# Patient Record
Sex: MALE | Race: WHITE | NOT HISPANIC OR LATINO | Employment: UNEMPLOYED | URBAN - METROPOLITAN AREA
[De-identification: names, ages, dates, MRNs, and addresses within clinical notes are randomized per-mention and may not be internally consistent; named-entity substitution may affect disease eponyms.]

---

## 2023-03-23 ENCOUNTER — TRANSFERRED RECORDS (OUTPATIENT)
Dept: HEALTH INFORMATION MANAGEMENT | Facility: CLINIC | Age: 33
End: 2023-03-23

## 2023-03-29 ENCOUNTER — TRANSFERRED RECORDS (OUTPATIENT)
Dept: HEALTH INFORMATION MANAGEMENT | Facility: CLINIC | Age: 33
End: 2023-03-29

## 2023-04-12 ENCOUNTER — TRANSFERRED RECORDS (OUTPATIENT)
Dept: HEALTH INFORMATION MANAGEMENT | Facility: CLINIC | Age: 33
End: 2023-04-12

## 2023-08-16 ENCOUNTER — TRANSFERRED RECORDS (OUTPATIENT)
Dept: HEALTH INFORMATION MANAGEMENT | Facility: CLINIC | Age: 33
End: 2023-08-16

## 2023-10-18 ENCOUNTER — TRANSCRIBE ORDERS (OUTPATIENT)
Dept: OTHER | Age: 33
End: 2023-10-18

## 2023-10-18 DIAGNOSIS — K62.82 DYSPLASIA OF ANUS: Primary | ICD-10-CM

## 2023-12-14 ENCOUNTER — TRANSFERRED RECORDS (OUTPATIENT)
Dept: HEALTH INFORMATION MANAGEMENT | Facility: CLINIC | Age: 33
End: 2023-12-14

## 2023-12-15 ENCOUNTER — TELEPHONE (OUTPATIENT)
Dept: SURGERY | Facility: CLINIC | Age: 33
End: 2023-12-15

## 2023-12-15 NOTE — TELEPHONE ENCOUNTER
Records Requested    Facility  [facility]  Fax: [phone]   Outcome * 12/15/23 9:33 AM Faxed urgent request to [facility] for records to be faxed to the clinic. - Melvi    * 12/15/23 11:18 AM Records received from [facility] and sent to HIM to be scanned into the chart. - Melvi    [facility]:  12/14/23, 9/7/23 - GI OV with Dr. Weaver  9/11/23 - CR OV with Dr. Del Angel  8/16/23 - SURG OV with Dr. Forrest  7/17/23 - CR OV with Dr. Alvarez    8/16/23 - OP Note for EUA, FULGURATION AND BIOPSY OF ANAL CONDYLOMA with Dr. Bhatt    5/29/23 - Colonoscopy  5/29/23 - EGD

## 2024-01-05 ENCOUNTER — OFFICE VISIT (OUTPATIENT)
Dept: SURGERY | Facility: CLINIC | Age: 34
End: 2024-01-05
Payer: COMMERCIAL

## 2024-01-05 VITALS — HEART RATE: 115 BPM | DIASTOLIC BLOOD PRESSURE: 72 MMHG | SYSTOLIC BLOOD PRESSURE: 123 MMHG | OXYGEN SATURATION: 98 %

## 2024-01-05 DIAGNOSIS — K62.82 ANAL DYSPLASIA: Primary | ICD-10-CM

## 2024-01-05 LAB
LAB DIRECTOR COMMENTS: ABNORMAL
LAB DIRECTOR DISCLAIMER: ABNORMAL
LAB DIRECTOR INTERPRETATION: ABNORMAL
LAB DIRECTOR METHODOLOGY: ABNORMAL
LAB DIRECTOR RESULTS: ABNORMAL
SPECIMEN DESCRIPTION: ABNORMAL

## 2024-01-05 PROCEDURE — 87624 HPV HI-RISK TYP POOLED RSLT: CPT | Performed by: NURSE PRACTITIONER

## 2024-01-05 PROCEDURE — 99203 OFFICE O/P NEW LOW 30 MIN: CPT | Mod: 25 | Performed by: NURSE PRACTITIONER

## 2024-01-05 PROCEDURE — 88112 CYTOPATH CELL ENHANCE TECH: CPT | Mod: 26 | Performed by: PATHOLOGY

## 2024-01-05 PROCEDURE — G0452 MOLECULAR PATHOLOGY INTERPR: HCPCS | Mod: 26 | Performed by: STUDENT IN AN ORGANIZED HEALTH CARE EDUCATION/TRAINING PROGRAM

## 2024-01-05 PROCEDURE — 88305 TISSUE EXAM BY PATHOLOGIST: CPT | Mod: TC | Performed by: NURSE PRACTITIONER

## 2024-01-05 PROCEDURE — 88305 TISSUE EXAM BY PATHOLOGIST: CPT | Mod: 26 | Performed by: PATHOLOGY

## 2024-01-05 PROCEDURE — 88112 CYTOPATH CELL ENHANCE TECH: CPT | Mod: TC | Performed by: NURSE PRACTITIONER

## 2024-01-05 PROCEDURE — 46601 DIAGNOSTIC ANOSCOPY: CPT | Performed by: NURSE PRACTITIONER

## 2024-01-05 PROCEDURE — 46607 DIAGNOSTIC ANOSCOPY & BIOPSY: CPT | Performed by: NURSE PRACTITIONER

## 2024-01-05 RX ORDER — PAROXETINE HYDROCHLORIDE HEMIHYDRATE 37.5 MG/1
1 TABLET, FILM COATED, EXTENDED RELEASE ORAL EVERY MORNING
COMMUNITY
Start: 2023-06-07

## 2024-01-05 RX ORDER — VORTIOXETINE 20 MG/1
1 TABLET, FILM COATED ORAL EVERY MORNING
COMMUNITY
Start: 2023-06-07

## 2024-01-05 RX ORDER — LIDOCAINE HYDROCHLORIDE 20 MG/ML
JELLY TOPICAL ONCE
Status: CANCELLED | OUTPATIENT
Start: 2024-01-05 | End: 2024-01-05

## 2024-01-05 RX ORDER — IODINE AND POTASSIUM IODIDE 50; 100 MG/ML; MG/ML
LIQUID ORAL ONCE
Status: COMPLETED | OUTPATIENT
Start: 2024-01-05 | End: 2024-01-05

## 2024-01-05 RX ORDER — LIDOCAINE HYDROCHLORIDE 20 MG/ML
JELLY TOPICAL ONCE
Status: COMPLETED | OUTPATIENT
Start: 2024-01-05 | End: 2024-01-05

## 2024-01-05 RX ORDER — PAROXETINE 10 MG/1
15 TABLET, FILM COATED ORAL
COMMUNITY
Start: 2023-06-07

## 2024-01-05 RX ORDER — EMTRICITABINE AND TENOFOVIR DISOPROXIL FUMARATE 200; 300 MG/1; MG/1
1 TABLET, FILM COATED ORAL EVERY EVENING
COMMUNITY

## 2024-01-05 RX ADMIN — IODINE AND POTASSIUM IODIDE: 50; 100 LIQUID ORAL at 08:16

## 2024-01-05 RX ADMIN — LIDOCAINE HYDROCHLORIDE: 20 JELLY TOPICAL at 08:16

## 2024-01-05 NOTE — PROGRESS NOTES
Surg[facility] Informed Consent Process Documentation    Study Name: High Resolution Anoscopy (HRA) Registry    IRB#: [id number]    ICF Version Date:  10.19.2023    Date of Approach/Consent: 1/5/2024      The subject was screened and meets all of the inclusion criteria and none of the exclusion criteria is met.      The subject was told:  -that the study involves research   -the purpose of the research study  -the expected duration of the study and the approximate number of subject sought  -of procedures that are identified as experimental  -of reasonably foreseeable risks or discomforts to the subject  -of any benefits to the subject or others that may be expected from the research  -of alternative procedures and/or treatment  -how the confidentiality of records would be maintained  -whether or not compensation and medical treatments are available should injury occur as a result of the study  -who to contact if they have questions related to the research study or questions regarding research subjects' rights  -that participation is completely voluntary and that their decision to or not to participate will have no impact on their relationships with the N and the staff    No study procedures were completed prior to the consent being obtained.  The use of historical information (lab or assessments) used for the purpose of the study was approved by subject.  The subject was fully aware that we would be reviewing their medical record for the study.    The subject demonstrated an understanding of what the study involved.  Specifically, how this study differed from standard of care at our center and what was required of the subject as part of the study.    The subject reviewed the consent form and was given the opportunity to ask questions before signing.  Questions and concerns were answered by the study staff and/or study physician.      A copy of the signed informed consent document was provided to the subject.  [x] Yes  [] No    The subject was offered a copy of the signed informed consent but declined. [] Yes [x] No    The consent required the use of a :   [] Yes []  No [x] NA    The subject required a legally-authorized representative (LAR) to sign on their behalf:                                                    [] Yes  [] No        [x] NA      Questions to Evaluate Subject Comprehension of Study:    Question: Adequate Response? If No, explain what actions were taken   What is being studied? [x] Yes  [] No   If you participate, what will be different than if you decide not to participate?  [x] Yes  [] No   How long will the study last; will you be required to return for visits?  [x] Yes  [] No   What kinds of risks are there?  [x] Yes  [] No   Do you understand that you can withdraw consent at any time and for any reason while participating in the study?   [x] Yes  [] No       :       [email or web address]  [phone]                             :           [email or web address]  [phone]

## 2024-01-05 NOTE — NURSING NOTE
Chief Complaint   Patient presents with    Follow Up       Vitals:    01/05/24 0810   BP: 123/72   BP Location: Left arm   Patient Position: Sitting   Cuff Size: Adult Regular   Pulse: 115   SpO2: 98%       There is no height or weight on file to calculate BMI.    Mary Harrison, CMA

## 2024-01-05 NOTE — PROGRESS NOTES
Colon and Rectal Surgery Clinic High Resolution Anoscopy Note    RE: Abdi Baer  : 1990  YOAN: 2024      Abdi Baer is a 33 year old MSM male with a history of anal dysplasia who presents today for high resolution anoscopy.     HPI: History of anal condylomata who underwent EU[facility] with [facility] and [facility] showing anal condylomata/LSIL. No immunosuppression. He does not smoke. Does have anal receptive intercourse but has not had this for over a year due to pain with anal intercourse. Had a colonoscopy earlier last year that was reportedly normal.    ASSESSMENT: Written, informed consent was obtained prior to procedure.  Prior to the start of the procedure and with procedural staff participation, I verbally confirmed the patient s identity using two indicators, relevant allergies, that the procedure was appropriate and matched the consent or emergent situation, and that the correct equipment/implants were available. Immediately prior to starting the procedure I conducted the Time Out with the procedural staff and re-confirmed the patient s name, procedure, and site/side. (The Joint Commission universal protocol was followed.)  Yes    Sedation (Moderate or Deep): None    Anal cytology was obtained with Dacron swab. Digital anal rectal exam was performed with some lesions palpable in the left lateral position. Dilute acetic acid soak was completed for 2 minutes. Lubricant was used to insert the anoscope. Performed high resolution microscopy using the Proctostation. The anal transition zone was viewed in its entirety. Abnormal areas noted were thickened areas of acetowhitening with atypical vessels in the posterior, right anterior, and left anterior positions at the dentate line. These were Lugol's negative. Biopsy was obtained at each of these sites using a baby Tischler forceps. Fulguration was not performed.   The perianal  area was inspected after acetic acid soak. The findings noted were no acetowhitening, punctation, or verruciform lesions.     The patient tolerated the procedures well.    PLAN: Will follow up with patient with results of biopsies from today. If high grade dysplasia or condyloma, would require fulguration in the OR. No anal fissure on exam today but given history of pain with anal intercourse, recommended he try pelvic floor physical therapy and he is agreeable to this.      For details of past medical history, surgical history, family history, medications, allergies, and review of systems, please see details below.    Medical history:  No past medical history on file.    Surgical history:  No past surgical history on file.    Family history:  No family history on file.    Medications:  No current outpatient medications on file.     Allergies:  The patienthas no allergies on file.    Social history:  Social History     Tobacco Use    Smoking status: Not on file    Smokeless tobacco: Not on file   Substance Use Topics    Alcohol use: Not on file     Marital status: .    Review of Systems:  There are no exam notes on file for this visit.     This procedure was performed under a collaborative agreement with Dr. Tad Sandoval MD, Chief of the Division of Colon and Rectal Surgery    Camille Fuller, NP-C  Colon and Rectal Surgery  [facility]      This note was created using speech recognition software and may contain unintended word substitutions.

## 2024-01-05 NOTE — PATIENT INSTRUCTIONS
See Reba Bernal, PT for anal fissures and pain  [facility] Dr[facility] Jerome Reddy

## 2024-01-05 NOTE — LETTER
2024       RE: Abdi Baer  [address] [place] [address]     Dear Colleague,    Thank you for referring your patient, Abdi Baer, to the [facility] COLON AND RECTAL SURGERY CLINIC [place] [facility]. Please see a copy of my visit note below.    Colon and Rectal Surgery Clinic High Resolution Anoscopy Note    RE: Abdi Baer  : 1990  YOAN: 2024      Abdi Baer is a 33 year old MSM male with a history of anal dysplasia who presents today for high resolution anoscopy.     HPI: History of anal condylomata who underwent EUA with biopsy and fulg[facility] on [facility] showing anal condylomata/LSIL. No immunosuppression. He does not smoke. Does have anal receptive intercourse but has not had this for over a year due to pain with anal intercourse. Had a colonoscopy earlier last year that was reportedly normal.    ASSESSMENT: Written, informed consent was obtained prior to procedure.  Prior to the start of the procedure and with procedural staff participation, I verbally confirmed the patient s identity using two indicators, relevant allergies, that the procedure was appropriate and matched the consent or emergent situation, and that the correct equipment/implants were available. Immediately prior to starting the procedure I conducted the Time Out with the procedural staff and re-confirmed the patient s name, procedure, and site/side. (The Joint Commission universal protocol was followed.)  Yes    Sedation (Moderate or Deep): None    Anal cytology was obtained with Dacron swab. Digital anal rectal exam was performed with some lesions palpable in the left lateral position. Dilute acetic acid soak was completed for 2 minutes. Lubricant was used to insert the anoscope. Performed high resolution microscopy using the Proctostation. The anal transition zone was  viewed in its entirety. Abnormal areas noted were thickened areas of acetowhitening with atypical vessels in the posterior, right anterior, and left anterior positions at the dentate line. Biopsy was obtained at each of these sites using a baby Tischler forceps. Fulguration was not performed.   The perianal area was inspected after acetic acid soak. The findings noted were no acetowhitening, punctation, or verruciform lesions.     The patient tolerated the procedures well.    PLAN: Will follow up with patient with results of biopsies from today. If high grade dysplasia or condyloma, would require fulguration in the OR. No anal fissure on exam today but given history of pain with anal intercourse, recommended he try pelvic floor physical therapy and he is agreeable to this.      For details of past medical history, surgical history, family history, medications, allergies, and review of systems, please see details below.    Medical history:  No past medical history on file.    Surgical history:  No past surgical history on file.    Family history:  No family history on file.    Medications:  No current outpatient medications on file.     Allergies:  The patienthas no allergies on file.    Social history:  Social History     Tobacco Use    Smoking status: Not on file    Smokeless tobacco: Not on file   Substance Use Topics    Alcohol use: Not on file     Marital status: .    Review of Systems:  There are no exam notes on file for this visit.     This procedure was performed under a collaborative agreement with Dr. Tad Sandoval MD, Chief of the Division of Colon and Rectal Surgery    This note was created using speech recognition software and may contain unintended word substitutions.      Again, thank you for allowing me to participate in the care of your patient.      Sincerely,    SCOTT Harris CNP

## 2024-01-08 DIAGNOSIS — K62.82 ANAL DYSPLASIA: Primary | ICD-10-CM

## 2024-01-08 LAB
PATH REPORT.COMMENTS IMP SPEC: ABNORMAL
PATH REPORT.COMMENTS IMP SPEC: ABNORMAL
PATH REPORT.COMMENTS IMP SPEC: NORMAL
PATH REPORT.COMMENTS IMP SPEC: NORMAL
PATH REPORT.COMMENTS IMP SPEC: YES
PATH REPORT.FINAL DX SPEC: ABNORMAL
PATH REPORT.FINAL DX SPEC: NORMAL
PATH REPORT.GROSS SPEC: ABNORMAL
PATH REPORT.GROSS SPEC: NORMAL
PATH REPORT.MICROSCOPIC SPEC OTHER STN: ABNORMAL
PATH REPORT.MICROSCOPIC SPEC OTHER STN: NORMAL
PATH REPORT.RELEVANT HX SPEC: ABNORMAL
PATH REPORT.RELEVANT HX SPEC: NORMAL
PHOTO IMAGE: NORMAL

## 2024-01-12 ENCOUNTER — TELEPHONE (OUTPATIENT)
Dept: SURGERY | Facility: CLINIC | Age: 34
End: 2024-01-12

## 2024-01-12 ENCOUNTER — HOSPITAL ENCOUNTER (OUTPATIENT)
Facility: AMBULATORY SURGERY CENTER | Age: 34
End: 2024-01-12
Attending: SURGERY
Payer: COMMERCIAL

## 2024-01-12 PROBLEM — K62.82 ANAL DYSPLASIA: Status: ACTIVE | Noted: 2024-01-08

## 2024-01-12 NOTE — TELEPHONE ENCOUNTER
On 1/12/2024 at 1:18 PM:    Patient is scheduled for surgery with Dr. Jerome Reddy    Spoke with: patient Abdi    Date of Surgery: Thurs 2/22/2024    [facility]    Informed patient they will need an adult  YES    Pre op with Provider (patient's surgery must be scheduled with Dr. Jerome Reddy)    Feb 05, 2024  9:45 AM  Pre-op H&P  (Arrive by 9:30 AM)  PAC EVALUATION with Tanisha [place] PA-C  [facility] Preoperative Assessment Center [facility] [phone]    [address] [place] [address]      Surgery packet: sent via [employer]     Additional comments:   - patient turned down surgery date 2/8/2024 in favor of 2/22/2024 since his  has an important medical appt on 2/9/2024 that he will be attending  - Sent message to clinical team about [facility] - [facility]    Marissa Rosado  Karine-op Coordinator  [place]  Direct Phone: [phone]

## 2024-01-26 DIAGNOSIS — G47.19 EXCESSIVE DAYTIME SLEEPINESS: Primary | ICD-10-CM

## 2024-02-05 ENCOUNTER — PRE VISIT (OUTPATIENT)
Dept: SURGERY | Facility: CLINIC | Age: 34
End: 2024-02-05

## 2024-02-05 ENCOUNTER — TELEPHONE (OUTPATIENT)
Dept: SURGERY | Facility: CLINIC | Age: 34
End: 2024-02-05

## 2024-02-05 ENCOUNTER — PREP FOR PROCEDURE (OUTPATIENT)
Dept: SURGERY | Facility: CLINIC | Age: 34
End: 2024-02-05

## 2024-02-05 ENCOUNTER — ANESTHESIA EVENT (OUTPATIENT)
Dept: SURGERY | Facility: CLINIC | Age: 34
End: 2024-02-05
Payer: COMMERCIAL

## 2024-02-05 ENCOUNTER — VIRTUAL VISIT (OUTPATIENT)
Dept: SURGERY | Facility: CLINIC | Age: 34
End: 2024-02-05
Payer: COMMERCIAL

## 2024-02-05 DIAGNOSIS — Z01.818 PRE-OP EVALUATION: Primary | ICD-10-CM

## 2024-02-05 PROCEDURE — 99203 OFFICE O/P NEW LOW 30 MIN: CPT | Mod: 95 | Performed by: PHYSICIAN ASSISTANT

## 2024-02-05 RX ORDER — BUDESONIDE AND FORMOTEROL FUMARATE DIHYDRATE 160; 4.5 UG/1; UG/1
2 AEROSOL RESPIRATORY (INHALATION) 2 TIMES DAILY
COMMUNITY

## 2024-02-05 RX ORDER — ARIPIPRAZOLE 10 MG/1
10 TABLET ORAL EVERY MORNING
COMMUNITY

## 2024-02-05 RX ORDER — OMEPRAZOLE 20 MG/1
20 TABLET, DELAYED RELEASE ORAL EVERY MORNING
COMMUNITY

## 2024-02-05 RX ORDER — MONTELUKAST SODIUM 10 MG/1
10 TABLET ORAL AT BEDTIME
COMMUNITY

## 2024-02-05 RX ORDER — ALBUTEROL SULFATE 90 UG/1
2 AEROSOL, METERED RESPIRATORY (INHALATION) EVERY 6 HOURS PRN
COMMUNITY

## 2024-02-05 RX ORDER — CETIRIZINE HYDROCHLORIDE 10 MG/1
10 TABLET ORAL EVERY MORNING
COMMUNITY

## 2024-02-05 ASSESSMENT — ENCOUNTER SYMPTOMS: SEIZURES: 0

## 2024-02-05 ASSESSMENT — LIFESTYLE VARIABLES: TOBACCO_USE: 0

## 2024-02-05 ASSESSMENT — PAIN SCALES - GENERAL: PAINLEVEL: NO PAIN (0)

## 2024-02-05 NOTE — H&P
Pre-Operative H & P     CC:  Preoperative exam to assess for increased cardiopulmonary risk while undergoing surgery and anesthesia.    Date of Encounter: 2/5/2024  Primary Care Physician:  No primary care provider on file.     Reason for visit:   Encounter Diagnosis   Name Primary?    Pre-op evaluation Yes       HPI  Abdi Baer is a 33 year old male who presents for pre-operative H & P in preparation for  Procedure Information       Case: [id number] Date/Time: 02/22/24 0830    Procedure: EXAM UNDER ANESTHESIA, RECTUM, ANOSCOPY, HIGH RESOLUTION, WITH anal dysplasia FULGURATION, BIOPSIES (Rectum)    Anesthesia type: MAC    Diagnosis: Anal dysplasia [K62.82]    Pre-op diagnosis: Anal dysplasia [K62.82]    Location: [place] [address] / [facility]    Providers: Jerome Reddy MD            Patient is being evaluated without significant comorbid conditions.     Mr. Hunter Baer has a known history of anal dysplasia. He is [facility] He was recently seen by colorectal surgery and is now scheduled for the above procedure.     History is obtained from the patient and chart review    Hx of abnormal bleeding or anti-platelet use: denies      Past Medical History  No past medical history on file.    Past Surgical History  Past Surgical History:   Procedure Laterality Date    EXAM UNDER ANESTHESIA ANUS      RECONSTRUCTION OF NOSE         Prior to Admission Medications  Current Outpatient Medications   Medication Sig Dispense Refill    cetirizine (ZYRTEC) 10 MG tablet Take 10 mg by mouth every morning      emtricitabine-tenofovir (TRUVADA) 200-300 MG per tablet Take 1 tablet by mouth every evening      montelukast (SINGULAIR) 10 MG tablet Take 10 mg by mouth at bedtime      PARoxetine (PAXIL) 10 MG tablet Take 15 mg by mouth daily (with lunch)      PARoxetine (PAXIL-CR) 37.5 MG 24 hr tablet Take 1 tablet by mouth  every morning      TRINTELLIX 20 MG tablet Take 1 tablet by mouth every morning         Allergies  Allergies   Allergen Reactions    Doxycycline Nausea and Vomiting    Amoxicillin Rash     Has taken cefitraxone before with out reaction. Per patient. 3/11/16   Treated with Bicillin IM with no reaction 6-2018 at [facility]       Social History  Social History     Socioeconomic History    Marital status:      Spouse name: Not on file    Number of children: Not on file    Years of education: Not on file    Highest education level: Not on file   Occupational History    Not on file   Tobacco Use    Smoking status: Never    Smokeless tobacco: Former     Types: Chew    Tobacco comments:     Uses nicotine lazonges   Substance and Sexual Activity    Alcohol use: Not Currently     Comment: Quit 2 yrs ago 02/05    Drug use: Yes     Types: Marijuana     Comment: 2-3 times a week    Sexual activity: Not on file   Other Topics Concern    Not on file   Social History Narrative    Not on file     Social Determinants of Health     Financial Resource Strain: Not on file   Food Insecurity: Not on file   Transportation Needs: Not on file   Physical Activity: Not on file   Stress: Not on file   Social Connections: Not on file   Interpersonal Safety: Not on file   Housing Stability: Not on file       Family History  Family History   Problem Relation Age of Onset    Anesthesia Reaction No family hx of     Deep Vein Thrombosis (DVT) No family hx of        Review of Systems  The complete review of systems is negative other than noted in the HPI or here.   Anesthesia Evaluation   Pt has had prior anesthetic.     History of anesthetic complications       ROS/MED HX  ENT/Pulmonary:     (+)                      asthma  Treatment: Inhaler prn and Inhaled steroids,              (-) tobacco use   Neurologic:  - neg neurologic ROS  (-) no seizures and no CVA   Cardiovascular:     (+)  - -   -  - -                                 No previous cardiac  testing  (-) taking anticoagulants/antiplatelets   METS/Exercise Tolerance: 4 - Raking leaves, gardening Comment: Riding bike, walking on treadmill, weight lifting    Hematologic:  - neg hematologic  ROS  (-) history of blood clots and history of blood transfusion   Musculoskeletal:  - neg musculoskeletal ROS     GI/Hepatic: Comment: Anal dysplasia     (+) GERD, Asymptomatic on medication,                  Renal/Genitourinary:  - neg Renal ROS     Endo:  - neg endo ROS  (-) chronic steroid usage   Psychiatric/Substance Use:     (+) psychiatric history        Infectious Disease:  - neg infectious disease ROS     Malignancy:  - neg malignancy ROS     Other:            Virtual visit -  No vitals were obtained    Physical Exam  Constitutional: Awake, alert, cooperative, no apparent distress, and appears stated age.  HENT: Normocephalic  Respiratory: non labored breathing   Neurologic: Awake, alert, oriented to name, place and time.   Neuropsychiatric: Calm, cooperative. Normal affect.      Prior Labs/Diagnostic Studies   All labs and imaging personally reviewed     EKG/ stress test - if available please see in ROS above   No results found.       No data to display                  The patient's records and results personally reviewed by this provider.     Outside records reviewed from: Care Everywhere      Assessment    Abdi Baer is a 33 year old male seen as a PAC referral for risk assessment and optimization for anesthesia.    Plan/Recommendations  Pt will be optimized for the proposed procedure.  See below for details on the assessment, risk, and preoperative recommendations    NEUROLOGY  - No history of TIA, CVA or seizure  -Post Op delirium risk factors:  No risk identified    ENT  - No current airway concerns.  Will need to be reassessed day of surgery.  Mallampati: Unable to assess  TM: Unable to assess    CARDIAC  - No history of CAD, Hypertension, and Afib  -Denies cardiac symptoms   - METS  "(Metabolic Equivalents)  Patient performs 4 or more METS exercise without symptoms            Total Score: 0      RCRI-Very low risk: Class 1 0.4% complication rate            Total Score: 0        PULMONARY  MAOSN Low Risk            Total Score: 1    MASON: Male      -asthma using budesonide daily and albuterol PRN. Feels his asthma is at baseline.   - Tobacco History    History   Smoking Status    Never   Smokeless Tobacco    Former    Types: Chew       GI  - anal dysplasia. Above procedure planned   PONV Low Risk  Total Score: 1           1 AN PONV: Patient is not a current smoker        /RENAL  - Baseline Creatinine  WNL    ENDOCRINE    - BMI: There is no height or weight on file to calculate BMI.  - No history of Diabetes Mellitus    HEME  VTE Low Risk 0.5%            Total Score: 2    VTE: Male      - No history of abnormal bleeding or antiplatelet use.    PSYCH  -PTSD. Using abilify, paxil and trintellix. Reports due to his PTSD, he is combative when waking from anesthesia. States he \"wakes swinging and no one should be within a couple feet.\" He reports due to this, he does better with general anesthesia than MAC anesthesia.   -due to this history, better candidate for the hospital setting than the [facility]. Will alert the surgery team. Patient is aware ultimate anesthesia plan will be determined by team DOS.     Different anesthesia methods/types have been discussed with the patient, but they are aware that the final plan will be decided by the assigned anesthesia provider on the date of service.    The patient is optimized for their procedure. AVS with information on surgery time/arrival time, meds and NPO status given by nursing staff. No further diagnostic testing indicated.    Please refer to the physical examination documented by the anesthesiologist in the anesthesia record on the day of surgery.    Video-Visit Details    Type of service:  Video Visit    Provider received verbal consent for a Video Visit " from the patient? Yes     Originating Location (pt. Location): Home    Distant Location (provider location):  Off-site  Mode of Communication:  Video Conference via [employer]  On the day of service:     Prep time: 6 minutes  Visit time: 12 minutes  Documentation time: 11 minutes  ------------------------------------------  Total time: 29 minutes      Tanisha [place] PA-C  Preoperative Assessment Center  [facility]  Clinic and Surgery Center  Phone: [phone]  Fax: [phone]

## 2024-02-05 NOTE — PROGRESS NOTES
Abdi is a 33 year old who is being evaluated via a billable video visit.      How would you like to obtain your AVS? MyChart  If the video visit is dropped, the invitation should be resent by: Text to cell phone: [phone]          HPI           Physical Exam

## 2024-02-05 NOTE — TELEPHONE ENCOUNTER
On 2/5/2024 at 11:39 AM:  Patient is on the [facility] Wait List to be rescheduled for surgery with Dr. Jerome Reddy     Spoke with: Abdi    Wait List Date of Surgery: Thurs 2/29/2024    Location: [facility]    Informed patient they will need an adult  YES    Pre op with Provider (patient's surgery must be scheduled with Dr. Jerome Reddy)    H&P: completed with PAC on 2/5/2024    Updated Surgery packet: sending via [employer] per patient request     Additional comments:   - per PAC provider Tanisha Overton, patient's surgery needs to be rescheduled to a Hospital OR d/t patient wakes from anesthesia combatively d/t PTSD and also General Anesthesia is recommended.    - Dr. Jerome Reddy's RNCC MAO Sargent replied confirming that case should be rescheduled to [facility], and anesthesia is to be switched to General.    - Case Modification entered to switch case to General Anesthesia.    - Dr. Reddy will not be doing cases at [facility] on currently scheduled DOS 2/22/2024, but will be doing cases at [facility] the following Thurs 2/29/2024.     - Removed case from [facility] OR schedule on 2/22/2024, and added case to the [facility] wait list for 2/29/2024.    On 2/5/2024 at 10:58 AM:  LVM for patient to call back regarding rescheduling - possibly to DOS 2/29/2024.    On 2/5/2024 at 11:02 AM:  Missed incoming call from patient.    On 2/5/2024 at 11:03 AM:  LVM for patient to call back regarding rescheduling.    Marissa Rosado  Karine-op Coordinator  [place]  Direct Phone: [phone]   Marissa Rosado  Karine-op Coordinator  [facility]  Direct Phone: [phone]

## 2024-02-05 NOTE — PATIENT INSTRUCTIONS
Preparing for Your Surgery      Name:  Abdi Baer   MRN:  [id number]   :  1990   Today's Date:  2024       Arriving for surgery:  Surgery date:  24  Arrival time:  07:00 am      Please come to:   [facility] [address] [address]     Parking is available in front of the Clinics and Surgery Center building from 5:30AM to 8:00PM.  -  Proceed to the 5th floor to check into the Ambulatory Surgery Center.              >> There will be patient concierges on the 1st and 5th floor, for assistance or an escort, if you would like.              >> Please call [phone] with any questions.    What can I eat or drink?  -  You may eat and drink normally up to 8 hours prior to arrival time.   -  You may have clear liquids until 2 hours prior to arrival time.  -2 FLEETS ENEMAS AM OF SURGERY    Examples of clear liquids:  Water  Clear broth  Juices (apple, white grape, white cranberry  and cider) without pulp  Noncarbonated, powder based beverages  (lemonade and Danish-Aid)  Sodas (Sprite, 7-Up, ginger ale and seltzer)  Coffee or tea (without milk or cream)  Gatorade    -  No Alcohol or cannabis products for at least 24 hours before surgery.     Which medicines can I take?    Hold Aspirin for 7 days before surgery.   Hold Multivitamins for 7 days before surgery.  Hold Supplements for 7 days before surgery.  Hold Ibuprofen (Advil, Motrin) for 1 day(s) before surgery--unless otherwise directed by surgeon.  Hold Naproxen (Aleve) for 4 days before surgery.    -  DO NOT take these medications the day of surgery:  Zyrtec.    -  PLEASE TAKE these medications the day of surgery:  Tylenol if needed; take other scheduled morning medications.    How do I prepare myself?  - Please take 2 showers (one the night prior to surgery and one the morning of surgery) using Scrubcare or Hibiclens soap.    Use this soap only from the neck to your  toes.     Leave the soap on your skin for one minute--then rinse thoroughly.      You may use your own shampoo and conditioner. No other hair products.   - Please remove all jewelry and body piercings.  - No lotions, deodorants or fragrance.  - No makeup or fingernail polish.   - Bring your ID and insurance card.    -If you use a CPAP machine, please bring the CPAP machine, tubing, and mask to hospital.    -If you have a Deep Brain Stimulator, Spinal Cord Stimulator, or any Neuro Stimulator device---you must bring the remote control to the hospital.      ALL PATIENTS GOING HOME THE SAME DAY OF SURGERY ARE REQUIRED TO HAVE A RESPONSIBLE ADULT TO DRIVE AND BE IN ATTENDANCE WITH THEM FOR 24 HOURS FOLLOWING SURGERY.    Covid testing policy as of 12/06/2022  Your surgeon will notify and schedule you for a COVID test if one is needed before surgery--please direct any questions or COVID symptoms to your surgeon      Questions or Concerns:    - For any questions regarding the day of surgery or your hospital stay, please contact the Pre Admission Nursing Office at [phone].       - If you have health changes between today and your surgery, please call your surgeon.       - For questions after surgery, please call your surgeons office.           Current Visitor Guidelines    You may have 2 visitors in the pre op area.    Visiting hours: 8 a.m. to 8:30 p.m.    Patients confirmed or suspected to have symptoms of COVID 19 or flu:     No visitors allowed for adult patients.   Children (under age 18) can have 1 named visitor.     People who are sick or showing symptoms of COVID 19 or flu:    Are not allowed to visit patients--we can only make exceptions in special situations.       Please follow these guidelines for your visit:          Please maintain social distance          Masking is optional--however at times you may be asked to wear a mask for the safety of yourself and others     Clean your hands with alcohol hand  . Do this when you arrive at and leave the building and patient room,    And again after you touch your mask or anything in the room.     Go directly to and from the room you are visiting.     Stay in the patient s room during your visit. Limit going to other places in the hospital as much as possible     Leave bags and jackets at home or in the car.     For everyone s health, please don t come and go during your visit. That includes for smoking   during your visit.

## 2024-02-29 ENCOUNTER — ANESTHESIA (OUTPATIENT)
Dept: SURGERY | Facility: CLINIC | Age: 34
End: 2024-02-29
Payer: COMMERCIAL

## 2024-02-29 ENCOUNTER — HOSPITAL ENCOUNTER (OUTPATIENT)
Facility: CLINIC | Age: 34
Discharge: HOME | End: 2024-02-29
Attending: SURGERY | Admitting: SURGERY
Payer: COMMERCIAL

## 2024-02-29 VITALS
OXYGEN SATURATION: 96 % | DIASTOLIC BLOOD PRESSURE: 65 MMHG | TEMPERATURE: 98 F | BODY MASS INDEX: 29.75 KG/M2 | HEART RATE: 77 BPM | HEIGHT: 69 IN | SYSTOLIC BLOOD PRESSURE: 103 MMHG | WEIGHT: 200.84 LBS | RESPIRATION RATE: 18 BRPM

## 2024-02-29 DIAGNOSIS — K62.82 ANAL DYSPLASIA: Primary | ICD-10-CM

## 2024-02-29 PROCEDURE — 360N000074 HC SURGERY LEVEL 1, PER MIN: Performed by: SURGERY

## 2024-02-29 PROCEDURE — 272N000001 HC OR GENERAL SUPPLY STERILE: Performed by: SURGERY

## 2024-02-29 PROCEDURE — 710N000012 HC RECOVERY PHASE 2, PER MINUTE: Performed by: SURGERY

## 2024-02-29 PROCEDURE — 250N000011 HC RX IP 250 OP 636: Performed by: NURSE ANESTHETIST, CERTIFIED REGISTERED

## 2024-02-29 PROCEDURE — 250N000009 HC RX 250: Performed by: SURGERY

## 2024-02-29 PROCEDURE — 370N000017 HC ANESTHESIA TECHNICAL FEE, PER MIN: Performed by: SURGERY

## 2024-02-29 PROCEDURE — 88305 TISSUE EXAM BY PATHOLOGIST: CPT | Mod: TC | Performed by: SURGERY

## 2024-02-29 PROCEDURE — 250N000025 HC SEVOFLURANE, PER MIN: Performed by: SURGERY

## 2024-02-29 PROCEDURE — 46910 DESTRUCTION ANAL LESION(S): CPT | Performed by: ANESTHESIOLOGY

## 2024-02-29 PROCEDURE — 46910 DESTRUCTION ANAL LESION(S): CPT | Performed by: NURSE ANESTHETIST, CERTIFIED REGISTERED

## 2024-02-29 PROCEDURE — 258N000003 HC RX IP 258 OP 636: Performed by: NURSE ANESTHETIST, CERTIFIED REGISTERED

## 2024-02-29 PROCEDURE — 272N000002 HC OR SUPPLY OTHER OPNP: Performed by: SURGERY

## 2024-02-29 PROCEDURE — 250N000011 HC RX IP 250 OP 636: Performed by: ANESTHESIOLOGY

## 2024-02-29 PROCEDURE — 46910 DESTRUCTION ANAL LESION(S): CPT | Performed by: SURGERY

## 2024-02-29 PROCEDURE — 710N000010 HC RECOVERY PHASE 1, LEVEL 2, PER MIN: Performed by: SURGERY

## 2024-02-29 PROCEDURE — 88305 TISSUE EXAM BY PATHOLOGIST: CPT | Mod: 26 | Performed by: PATHOLOGY

## 2024-02-29 PROCEDURE — 999N000141 HC STATISTIC PRE-PROCEDURE NURSING ASSESSMENT: Performed by: SURGERY

## 2024-02-29 PROCEDURE — 250N000009 HC RX 250: Performed by: NURSE ANESTHETIST, CERTIFIED REGISTERED

## 2024-02-29 RX ORDER — HALOPERIDOL 5 MG/ML
1 INJECTION INTRAMUSCULAR
Status: DISCONTINUED | OUTPATIENT
Start: 2024-02-29 | End: 2024-02-29 | Stop reason: HOSPADM

## 2024-02-29 RX ORDER — IODINE AND POTASSIUM IODIDE 50; 100 MG/ML; MG/ML
LIQUID ORAL PRN
Status: DISCONTINUED | OUTPATIENT
Start: 2024-02-29 | End: 2024-02-29 | Stop reason: HOSPADM

## 2024-02-29 RX ORDER — ONDANSETRON 4 MG/1
4 TABLET, ORALLY DISINTEGRATING ORAL EVERY 30 MIN PRN
Status: DISCONTINUED | OUTPATIENT
Start: 2024-02-29 | End: 2024-02-29 | Stop reason: HOSPADM

## 2024-02-29 RX ORDER — NALOXONE HYDROCHLORIDE 0.4 MG/ML
0.1 INJECTION, SOLUTION INTRAMUSCULAR; INTRAVENOUS; SUBCUTANEOUS
Status: DISCONTINUED | OUTPATIENT
Start: 2024-02-29 | End: 2024-02-29 | Stop reason: HOSPADM

## 2024-02-29 RX ORDER — LIDOCAINE 40 MG/G
CREAM TOPICAL
Status: DISCONTINUED | OUTPATIENT
Start: 2024-02-29 | End: 2024-02-29 | Stop reason: HOSPADM

## 2024-02-29 RX ORDER — HYDRALAZINE HYDROCHLORIDE 20 MG/ML
2.5-5 INJECTION INTRAMUSCULAR; INTRAVENOUS EVERY 10 MIN PRN
Status: DISCONTINUED | OUTPATIENT
Start: 2024-02-29 | End: 2024-02-29 | Stop reason: HOSPADM

## 2024-02-29 RX ORDER — OXYCODONE HYDROCHLORIDE 10 MG/1
10 TABLET ORAL
Status: DISCONTINUED | OUTPATIENT
Start: 2024-02-29 | End: 2024-02-29 | Stop reason: HOSPADM

## 2024-02-29 RX ORDER — HYDROMORPHONE HCL IN WATER/PF 6 MG/30 ML
0.4 PATIENT CONTROLLED ANALGESIA SYRINGE INTRAVENOUS EVERY 5 MIN PRN
Status: DISCONTINUED | OUTPATIENT
Start: 2024-02-29 | End: 2024-02-29 | Stop reason: HOSPADM

## 2024-02-29 RX ORDER — PROPOFOL 10 MG/ML
INJECTION, EMULSION INTRAVENOUS PRN
Status: DISCONTINUED | OUTPATIENT
Start: 2024-02-29 | End: 2024-02-29

## 2024-02-29 RX ORDER — ACETIC ACID 5 %
LIQUID (ML) MISCELLANEOUS PRN
Status: DISCONTINUED | OUTPATIENT
Start: 2024-02-29 | End: 2024-02-29 | Stop reason: HOSPADM

## 2024-02-29 RX ORDER — ONDANSETRON 2 MG/ML
4 INJECTION INTRAMUSCULAR; INTRAVENOUS EVERY 30 MIN PRN
Status: DISCONTINUED | OUTPATIENT
Start: 2024-02-29 | End: 2024-02-29 | Stop reason: HOSPADM

## 2024-02-29 RX ORDER — HYDROMORPHONE HCL IN WATER/PF 6 MG/30 ML
0.2 PATIENT CONTROLLED ANALGESIA SYRINGE INTRAVENOUS EVERY 5 MIN PRN
Status: DISCONTINUED | OUTPATIENT
Start: 2024-02-29 | End: 2024-02-29 | Stop reason: HOSPADM

## 2024-02-29 RX ORDER — LABETALOL HYDROCHLORIDE 5 MG/ML
10 INJECTION, SOLUTION INTRAVENOUS
Status: DISCONTINUED | OUTPATIENT
Start: 2024-02-29 | End: 2024-02-29 | Stop reason: HOSPADM

## 2024-02-29 RX ORDER — ACETAMINOPHEN 325 MG/1
650 TABLET ORAL EVERY 6 HOURS PRN
Qty: 30 TABLET | Refills: 0 | Status: SHIPPED | OUTPATIENT
Start: 2024-02-29 | End: 2024-03-14

## 2024-02-29 RX ORDER — ONDANSETRON 2 MG/ML
INJECTION INTRAMUSCULAR; INTRAVENOUS PRN
Status: DISCONTINUED | OUTPATIENT
Start: 2024-02-29 | End: 2024-02-29

## 2024-02-29 RX ORDER — LIDOCAINE HYDROCHLORIDE 20 MG/ML
INJECTION, SOLUTION INFILTRATION; PERINEURAL PRN
Status: DISCONTINUED | OUTPATIENT
Start: 2024-02-29 | End: 2024-02-29

## 2024-02-29 RX ORDER — OXYCODONE HYDROCHLORIDE 5 MG/1
5 TABLET ORAL
Status: DISCONTINUED | OUTPATIENT
Start: 2024-02-29 | End: 2024-02-29 | Stop reason: HOSPADM

## 2024-02-29 RX ORDER — FENTANYL CITRATE 50 UG/ML
INJECTION, SOLUTION INTRAMUSCULAR; INTRAVENOUS PRN
Status: DISCONTINUED | OUTPATIENT
Start: 2024-02-29 | End: 2024-02-29

## 2024-02-29 RX ORDER — ONDANSETRON 2 MG/ML
4 INJECTION INTRAMUSCULAR; INTRAVENOUS ONCE
Status: DISCONTINUED | OUTPATIENT
Start: 2024-02-29 | End: 2024-02-29 | Stop reason: HOSPADM

## 2024-02-29 RX ORDER — SODIUM CHLORIDE, SODIUM LACTATE, POTASSIUM CHLORIDE, CALCIUM CHLORIDE 600; 310; 30; 20 MG/100ML; MG/100ML; MG/100ML; MG/100ML
INJECTION, SOLUTION INTRAVENOUS CONTINUOUS
Status: DISCONTINUED | OUTPATIENT
Start: 2024-02-29 | End: 2024-02-29 | Stop reason: HOSPADM

## 2024-02-29 RX ORDER — BUPIVACAINE HYDROCHLORIDE AND EPINEPHRINE 2.5; 5 MG/ML; UG/ML
INJECTION, SOLUTION EPIDURAL; INFILTRATION; INTRACAUDAL; PERINEURAL PRN
Status: DISCONTINUED | OUTPATIENT
Start: 2024-02-29 | End: 2024-02-29 | Stop reason: HOSPADM

## 2024-02-29 RX ORDER — SODIUM CHLORIDE, SODIUM LACTATE, POTASSIUM CHLORIDE, CALCIUM CHLORIDE 600; 310; 30; 20 MG/100ML; MG/100ML; MG/100ML; MG/100ML
INJECTION, SOLUTION INTRAVENOUS CONTINUOUS PRN
Status: DISCONTINUED | OUTPATIENT
Start: 2024-02-29 | End: 2024-02-29

## 2024-02-29 RX ORDER — ACETAMINOPHEN 325 MG/1
650 TABLET ORAL
Status: DISCONTINUED | OUTPATIENT
Start: 2024-02-29 | End: 2024-02-29 | Stop reason: HOSPADM

## 2024-02-29 RX ORDER — ONDANSETRON 4 MG/1
4 TABLET, ORALLY DISINTEGRATING ORAL
Status: DISCONTINUED | OUTPATIENT
Start: 2024-02-29 | End: 2024-02-29 | Stop reason: HOSPADM

## 2024-02-29 RX ADMIN — PROPOFOL 70 MG: 10 INJECTION, EMULSION INTRAVENOUS at 10:48

## 2024-02-29 RX ADMIN — FENTANYL CITRATE 100 MCG: 50 INJECTION INTRAMUSCULAR; INTRAVENOUS at 10:07

## 2024-02-29 RX ADMIN — SUGAMMADEX 200 MG: 100 INJECTION, SOLUTION INTRAVENOUS at 11:22

## 2024-02-29 RX ADMIN — Medication 50 MG: at 10:07

## 2024-02-29 RX ADMIN — ONDANSETRON 4 MG: 2 INJECTION INTRAMUSCULAR; INTRAVENOUS at 11:02

## 2024-02-29 RX ADMIN — PROPOFOL 130 MG: 10 INJECTION, EMULSION INTRAVENOUS at 10:07

## 2024-02-29 RX ADMIN — SODIUM CHLORIDE, POTASSIUM CHLORIDE, SODIUM LACTATE AND CALCIUM CHLORIDE: 600; 310; 30; 20 INJECTION, SOLUTION INTRAVENOUS at 09:53

## 2024-02-29 RX ADMIN — SODIUM CHLORIDE, POTASSIUM CHLORIDE, SODIUM LACTATE AND CALCIUM CHLORIDE: 600; 310; 30; 20 INJECTION, SOLUTION INTRAVENOUS at 11:02

## 2024-02-29 RX ADMIN — HYDROMORPHONE HYDROCHLORIDE 0.4 MG: 0.2 INJECTION, SOLUTION INTRAMUSCULAR; INTRAVENOUS; SUBCUTANEOUS at 12:00

## 2024-02-29 RX ADMIN — MIDAZOLAM 4 MG: 1 INJECTION INTRAMUSCULAR; INTRAVENOUS at 09:53

## 2024-02-29 RX ADMIN — LIDOCAINE HYDROCHLORIDE 100 MG: 20 INJECTION, SOLUTION INFILTRATION; PERINEURAL at 10:07

## 2024-02-29 ASSESSMENT — ENCOUNTER SYMPTOMS
SEIZURES: 0
DYSRHYTHMIAS: 0

## 2024-02-29 ASSESSMENT — COPD QUESTIONNAIRES: COPD: 0

## 2024-02-29 ASSESSMENT — ACTIVITIES OF DAILY LIVING (ADL)
ADLS_ACUITY_SCORE: 35

## 2024-02-29 ASSESSMENT — LIFESTYLE VARIABLES: TOBACCO_USE: 1

## 2024-02-29 NOTE — ANESTHESIA POSTPROCEDURE EVALUATION
Patient: Abdi Baer    Procedure: Procedure(s):  EXAM UNDER ANESTHESIA, RECTUM, ANOSCOPY, HIGH RESOLUTION, WITH anal dysplasia FULGURATION, BIOPSIES       Anesthesia Type:  General    Note:  Disposition: Outpatient   Postop Pain Control: Uneventful            Sign Out: Well controlled pain   PONV: No   Neuro/Psych: Uneventful            Sign Out: Acceptable/Baseline neuro status   Airway/Respiratory: Uneventful            Sign Out: Acceptable/Baseline resp. status   CV/Hemodynamics: Uneventful            Sign Out: Acceptable CV status; No obvious hypovolemia; No obvious fluid overload   Other NRE: NONE   DID A NON-ROUTINE EVENT OCCUR? No           Last vitals:  Vitals Value Taken Time   /78 02/29/24 1215   Temp 36.6  C (97.8  F) 02/29/24 1215   Pulse 75 02/29/24 1221   Resp 8 02/29/24 1221   SpO2 98 % 02/29/24 1237   Vitals shown include unfiled device data.    Electronically Signed By: Jeffrey Abarca MD  February 29, 2024  12:39 PM

## 2024-02-29 NOTE — OP NOTE
"[facility] Colorectal Surgery Operative Report  February 29, 2024      PREOPERATIVE DIAGNOSIS:  1. History of high grade anal epithelial neoplasia  2. GERD  3. Asthma  4. PTSD    POSTOPERATIVE DIAGNOSIS:   1. History of high grade anal epithelial neoplasia  2. GERD  3. Asthma  4. PTSD    PROCEDURE:  1. Evaluation under anesthesia.  2. High resolution anoscopy  3. Anal biopsy  4. Fulguration of high grade anal dysplasia    ANESTHESIA: general anesthesia plus local anesthesia.    SURGEON:  Jerome Reddy MD, PhD     ASSISTANT(S): Amber Nixon MD - PGY-1.    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    General risks related to anorectal surgery were reviewed with the patient. These include, but are not limited to urinary retention, abscess, infection, bleeding, chronic pain, anal stenosis, fistula, fissure, and fecal incontinence.     OPERATIVE PROCEDURE: After obtaining informed consent, the patient was brought to the operating room and placed in the lithotomy position. Appropriate preoperative mechanical deep venous thrombosis prophylaxis was administered. general anesthesia was gently induced and he was endotracheally intubated. Bilateral lower extremity pneumatic compression devices were applied and all pressure points were cushioned.     After a \"time-out\" was performed, a total of 30 ml of Bupivacaine 0.25% without epinephrine was injected as a pudendal block.     Anal cytology was not obtained with Dacron swab. Digital anal rectal exam was performed with no major findings. Dilute acetic acid " soak was completed for 2 minutes. Lubricant was used to insert the anoscope. Performed high resolution microscopy using the THD video anoscope. The dentate line was viewed in its entirety. Abnormal areas noted were:    Left posterior SCJ - biopsy & fulguration  Left anterior SCJ - biopsy & fulguration     Biopsy was obtained. Fulguration was performed.     The perianal area was inspected after acetic acid soak. The findings noted were as above.     The patient tolerated the procedures well.    PLAN: Will follow up with patient with results of biopsy. If low grade dysplasia or normal, follow up in 12 months for repeat high resolution anoscopy. If high grade dysplasia follow up in 6 months.     COMPLICATIONS: none, immediately.    ESTIMATED BLOOD LOSS: 2 mL.    SPECIMEN(S): see above.    OPERATIVE FINDINGS: see above    DISPOSITION: PACU.    Jerome Reddy MD, PhD  Division of Colon and Rectal Surgery  [facility]      CC:  [facility] Surgery billing.  Camille Fuller NP.

## 2024-02-29 NOTE — ANESTHESIA CARE TRANSFER NOTE
Patient: Abdi Baer    Procedure: Procedure(s):  EXAM UNDER ANESTHESIA, RECTUM, ANOSCOPY, HIGH RESOLUTION, WITH anal dysplasia FULGURATION, BIOPSIES       Diagnosis: Anal dysplasia [K62.82]  Diagnosis Additional Information: No value filed.    Anesthesia Type:   General     Note:      Level of Consciousness: awake  Oxygen Supplementation: room air    Independent Airway: airway patency satisfactory and stable    Vital Signs Stable: post-procedure vital signs reviewed and stable    Patient transferred to: PACU  Comments: Tolerated anesthesia well  Handoff Report: Identifed the Patient, Identified the Reponsible Provider, Reviewed the pertinent medical history, Discussed the surgical course, Reviewed Intra-OP anesthesia mangement and issues during anesthesia, Set expectations for post-procedure period and Allowed opportunity for questions and acknowledgement of understanding      Vitals:  Vitals Value Taken Time   /76 02/29/24 1145   Temp     Pulse 77 02/29/24 1158   Resp 4 02/29/24 1158   SpO2 97 % 02/29/24 1158   Vitals shown include unfiled device data.    Electronically Signed By: SCOTT Gardner CRNA  February 29, 2024  11:59 AM

## 2024-02-29 NOTE — ANESTHESIA PROCEDURE NOTES
Airway       Patient location during procedure: OR       Procedure Start/Stop Times: 2/29/2024 10:10 AM  Staff -        CRNA: Drea Oviedo APRN CRNA       Performed By: CRNA  Consent for Airway        Urgency: elective  Indications and Patient Condition       Indications for airway management: yesica-procedural       Induction type:intravenous       Mask difficulty assessment: 1 - vent by mask    Final Airway Details       Final airway type: endotracheal airway       Successful airway: ETT - single  Endotracheal Airway Details        ETT size (mm): 7.5       Cuffed: yes       Cuff volume (mL): 8       Successful intubation technique: direct laryngoscopy       DL Blade Type: MAC 4       Grade View of Cords: 1       Position: Right       Measured from: lips       Secured at (cm): 22       Bite block used: None    Post intubation assessment        Placement verified by: capnometry and equal breath sounds        Number of attempts at approach: 2       Number of other approaches attempted: 0       Secured with: tape       Ease of procedure: easy       Dentition: Intact and Unchanged    Medication(s) Administered   Medication Administration Time: 2/29/2024 10:10 AM

## 2024-02-29 NOTE — ANESTHESIA PREPROCEDURE EVALUATION
Anesthesia Pre-Procedure Evaluation    Patient: Abdi Baer   MRN: [id number] : 1990        Procedure : Procedure(s):  EXAM UNDER ANESTHESIA, RECTUM, ANOSCOPY, HIGH RESOLUTION, WITH anal dysplasia FULGURATION, BIOPSIES          No past medical history on file.   Past Surgical History:   Procedure Laterality Date    EXAM UNDER ANESTHESIA ANUS      RECONSTRUCTION OF NOSE        Allergies   Allergen Reactions    Doxycycline Nausea and Vomiting    Amoxicillin Rash     Has taken cefitraxone before with out reaction. Per patient. 3/11/16   Treated with Bicillin IM with no reaction  at [facility]      Social History     Tobacco Use    Smoking status: Never    Smokeless tobacco: Former     Types: Chew    Tobacco comments:     Uses nicotine lazonges   Substance Use Topics    Alcohol use: Not Currently     Comment: Quit 2 yrs ago       Wt Readings from Last 1 Encounters:   24 91.1 kg (200 lb 13.4 oz)        Anesthesia Evaluation            ROS/MED HX  ENT/Pulmonary:     (+)                tobacco use (Chew; now use tobacco free nicotine lozenges), Past use,     asthma  Treatment: Inhaled steroids,              (-) COPD   Neurologic:    (-) no seizures, no CVA and no TIA   Cardiovascular:    (-) hypertension, CAD, arrhythmias and stent   METS/Exercise Tolerance: >4 METS    Hematologic:       Musculoskeletal:       GI/Hepatic:    (-) liver disease   Renal/Genitourinary:    (-) renal disease   Endo:    (-) Type II DM and thyroid disease   Psychiatric/Substance Use:       Infectious Disease:       Malignancy:       Other:            Physical Exam    Airway        Mallampati: II   TM distance: > 3 FB   Neck ROM: full   Mouth opening: > 3 cm    Respiratory Devices and Support         Dental       (+) Completely normal teeth      Cardiovascular          Rhythm and rate: regular and normal     Pulmonary           breath sounds clear to auscultation   (-) no rhonchi and no rales        OUTSIDE  "LABS:  CBC: No results found for: \"WBC\", \"HGB\", \"HCT\", \"PLT\"  BMP: No results found for: \"NA\", \"POTASSIUM\", \"CHLORIDE\", \"CO2\", \"BUN\", \"CR\", \"GLC\"  COAGS: No results found for: \"PTT\", \"INR\", \"FIBR\"  POC: No results found for: \"BGM\", \"HCG\", \"HCGS\"  HEPATIC: No results found for: \"ALBUMIN\", \"PROTTOTAL\", \"ALT\", \"AST\", \"GGT\", \"ALKPHOS\", \"BILITOTAL\", \"BILIDIRECT\", \"ROHAN\"  OTHER: No results found for: \"PH\", \"LACT\", \"A1C\", \"PUNEET\", \"PHOS\", \"MAG\", \"LIPASE\", \"AMYLASE\", \"TSH\", \"T4\", \"T3\", \"CRP\", \"SED\"    Anesthesia Plan    ASA Status:  2    NPO Status:  NPO Appropriate    Anesthesia Type: General.     - Airway: ETT   Induction: Intravenous, Propofol.   Maintenance: Inhalation.        Consents    Anesthesia Plan(s) and associated risks, benefits, and realistic alternatives discussed. Questions answered and patient/representative(s) expressed understanding.     - Discussed:     - Discussed with:  Patient      - Extended Intubation/Ventilatory Support Discussed: No.      - Patient is DNR/DNI Status: No     Use of blood products discussed: Yes.     - Discussed with: Patient.     - Consented: consented to blood products     Postoperative Care    Pain management: IV analgesics.   PONV prophylaxis: Dexamethasone or Solumedrol, Ondansetron (or other 5HT-3)     Comments:               Neymar Arevalo MD    I have reviewed the pertinent notes and labs in the chart from the past 30 days and (re)examined the patient.  Any updates or changes from those notes are reflected in this note.              # Overweight: Estimated body mass index is 29.66 kg/m  as calculated from the following:    Height as of this encounter: 1.753 m (5' 9\").    Weight as of this encounter: 91.1 kg (200 lb 13.4 oz).      "

## 2024-02-29 NOTE — DISCHARGE INSTRUCTIONS
Contacting your Doctor -   To contact a doctor, call Dr. Barry powell @[phone] or:  [phone] and ask for the resident on call for [place]Rect[facility] (answered 24 hours a day)   Emergency Department:  [facility]: [phone] if you are in need of immediate or emergent help

## 2024-03-01 LAB
PATH REPORT.COMMENTS IMP SPEC: NORMAL
PATH REPORT.COMMENTS IMP SPEC: NORMAL
PATH REPORT.FINAL DX SPEC: NORMAL
PATH REPORT.GROSS SPEC: NORMAL
PATH REPORT.MICROSCOPIC SPEC OTHER STN: NORMAL
PATH REPORT.RELEVANT HX SPEC: NORMAL
PHOTO IMAGE: NORMAL

## 2024-03-10 ENCOUNTER — HEALTH MAINTENANCE LETTER (OUTPATIENT)
Age: 34
End: 2024-03-10

## 2024-04-15 ASSESSMENT — SLEEP AND FATIGUE QUESTIONNAIRES
HOW LIKELY ARE YOU TO NOD OFF OR FALL ASLEEP WHILE SITTING INACTIVE IN A PUBLIC PLACE: SLIGHT CHANCE OF DOZING
HOW LIKELY ARE YOU TO NOD OFF OR FALL ASLEEP WHILE SITTING AND TALKING TO SOMEONE: SLIGHT CHANCE OF DOZING
HOW LIKELY ARE YOU TO NOD OFF OR FALL ASLEEP WHILE LYING DOWN TO REST IN THE AFTERNOON WHEN CIRCUMSTANCES PERMIT: MODERATE CHANCE OF DOZING
HOW LIKELY ARE YOU TO NOD OFF OR FALL ASLEEP WHILE WATCHING TV: SLIGHT CHANCE OF DOZING
HOW LIKELY ARE YOU TO NOD OFF OR FALL ASLEEP WHEN YOU ARE A PASSENGER IN A CAR FOR AN HOUR WITHOUT A BREAK: MODERATE CHANCE OF DOZING
HOW LIKELY ARE YOU TO NOD OFF OR FALL ASLEEP WHILE SITTING QUIETLY AFTER LUNCH WITHOUT ALCOHOL: SLIGHT CHANCE OF DOZING
HOW LIKELY ARE YOU TO NOD OFF OR FALL ASLEEP IN A CAR, WHILE STOPPED FOR A FEW MINUTES IN TRAFFIC: SLIGHT CHANCE OF DOZING
HOW LIKELY ARE YOU TO NOD OFF OR FALL ASLEEP WHILE SITTING AND READING: HIGH CHANCE OF DOZING

## 2024-04-22 ENCOUNTER — VIRTUAL VISIT (OUTPATIENT)
Dept: SLEEP MEDICINE | Facility: CLINIC | Age: 34
End: 2024-04-22
Payer: COMMERCIAL

## 2024-04-22 VITALS — HEIGHT: 69 IN | WEIGHT: 195 LBS | BODY MASS INDEX: 28.88 KG/M2

## 2024-04-22 DIAGNOSIS — G47.63 SLEEP RELATED BRUXISM: ICD-10-CM

## 2024-04-22 DIAGNOSIS — R51.9 SLEEP RELATED HEADACHES: ICD-10-CM

## 2024-04-22 DIAGNOSIS — F41.9 ANXIETY: ICD-10-CM

## 2024-04-22 DIAGNOSIS — R53.82 CHRONIC FATIGUE: ICD-10-CM

## 2024-04-22 DIAGNOSIS — R06.83 SNORING: ICD-10-CM

## 2024-04-22 DIAGNOSIS — G47.00 PERSISTENT INSOMNIA: Primary | ICD-10-CM

## 2024-04-22 PROBLEM — F90.9 ATTENTION DEFICIT HYPERACTIVITY DISORDER: Status: ACTIVE | Noted: 2024-04-22

## 2024-04-22 PROBLEM — G37.9 DEMYELINATING DISEASE OF CENTRAL NERVOUS SYSTEM, UNSPECIFIED (H): Status: ACTIVE | Noted: 2024-04-22

## 2024-04-22 PROBLEM — K21.9 GASTROESOPHAGEAL REFLUX DISEASE: Status: ACTIVE | Noted: 2024-04-22

## 2024-04-22 PROBLEM — R41.3 AMNESIA: Status: ACTIVE | Noted: 2024-04-22

## 2024-04-22 PROBLEM — Z77.29 EXPOSURE TO POTENTIALLY HAZARDOUS SUBSTANCE: Status: ACTIVE | Noted: 2024-03-06

## 2024-04-22 PROBLEM — M95.0 ACQUIRED DEFORMITY OF NOSE: Status: ACTIVE | Noted: 2024-04-22

## 2024-04-22 PROBLEM — M54.50 LOW BACK PAIN: Status: ACTIVE | Noted: 2024-04-22

## 2024-04-22 PROBLEM — F43.23 ADJUSTMENT DISORDER WITH MIXED ANXIETY AND DEPRESSED MOOD: Status: ACTIVE | Noted: 2024-04-22

## 2024-04-22 PROBLEM — Z87.820 PERSONAL HISTORY OF TRAUMATIC BRAIN INJURY: Status: ACTIVE | Noted: 2024-04-22

## 2024-04-22 PROBLEM — J30.1 ALLERGIC RHINITIS DUE TO POLLEN: Status: ACTIVE | Noted: 2024-04-22

## 2024-04-22 PROBLEM — J45.909 ASTHMA: Status: ACTIVE | Noted: 2024-04-22

## 2024-04-22 PROBLEM — G43.109 MIGRAINE WITH AURA, NOT INTRACTABLE, WITHOUT STATUS MIGRAINOSUS: Status: ACTIVE | Noted: 2024-04-22

## 2024-04-22 PROBLEM — F43.12 CHRONIC POST-TRAUMATIC STRESS DISORDER: Status: ACTIVE | Noted: 2024-04-22

## 2024-04-22 PROBLEM — M54.2 CERVICALGIA: Status: ACTIVE | Noted: 2018-06-07

## 2024-04-22 PROCEDURE — 99204 OFFICE O/P NEW MOD 45 MIN: CPT | Mod: 95 | Performed by: INTERNAL MEDICINE

## 2024-04-22 ASSESSMENT — PAIN SCALES - GENERAL: PAINLEVEL: NO PAIN (0)

## 2024-04-22 NOTE — NURSING NOTE
Is the patient currently in the state of [place]? YES    Visit mode:VIDEO    If the visit is dropped, the patient can be reconnected by: VIDEO VISIT: Send to e-mail at: [email or web address]    Will anyone else be joining the visit? NO  (If patient encounters technical issues they should call [phone]    How would you like to obtain your AVS? MyChart    Are changes needed to the allergy or medication list? Pt stated no med changes    Are refills needed on medications prescribed by this physician? NO    Reason for visit: Consult    Lisa MORANF  Has patient had flu shot for current/most recent flu season? If so, [facility]? [facility]

## 2024-04-22 NOTE — PROGRESS NOTES
Video-Visit Details    Type of service:  Video Visit    Video Visit Start Time:10:02 AM    Video End Time:10:35 AM    Originating Location (pt. Location): Home    Distant Location (provider location):  Off-site     Platform used for Video Visit: AmWell    Additional 15 minutes on the date of service was spent performing the following:    -Preparing to see the patient  -Obtaining and/or reviewing separately obtained history   -Ordering medications, tests, or procedures   -Documenting clinical information in the electronic or other health record     Thank you for the opportunity to participate in the care of  Abdi Baer.    Assessment and Plan:    In summary Abdi Baer is a 33 year old year old male here for sleep disturbance.  1. Persistent Insomnia/Chronic fatigue/Snoring/Anxiety/Sleep related bruxism/Sleep related headaches   Abdi Baer has high risk for obstructive sleep apnea based on the history of persistent insomnia, chronic fatigue, snoring and a crowded airway. I educated the patient on the underlying pathophysiology of obstructive sleep apnea. We reviewed the risks associated with sleep apnea, including increased cardiovascular risk and overall death. We talked about treatments briefly. I recommend getting a full-night nocturnal polysomnography. The patient should return to the clinic to discuss results and treatment option in a patient-centered approach.      History of cranial facial surgery? Yes. History of nasal surgery. Will need ENT evaluation prior to PAP therapy.    Lab reviewed: Discussed with patient.    History of present illness:    He is a 33 year old male who comes to the virtual clinic with a chief complaint of persistent insomnia that has been going on for approximately 4 years. While the patient denies any episodes of witness apnea, he has been told that he does have occasional snoring during sleep. His main complaints is that he has difficulty  initiating and maintaining sleep. He also complain of sleep related bruxism and headaches. His poor sleep quality would cause chronic fatigue during his waking hours. Complicating matters further is the fact that he is a  [facility]     Ideal Sleep-Wake Cycle(devoid of societal pressure):    Patient would try to initiate sleep at around 10:30 PM. Final wake up time is around 11 AM.    TIME IN BED:    1) Work/School Days:    Do you work or go to school? Yes   What time do you usually get into bed? 10:30   About how long does it take you to fall asleep? about an hour   How often do you have trouble falling asleep? 4-5   How often do you wake up during the night? 2-3   Do you work days/evenings/nights/rotating shifts? Days   What wakes you up at night?    How often do you have trouble falling back to sleep? 3-4   About how long does it take to fall back to sleep? 30-45 mins   What do you usually do if you have trouble getting back to sleep? eat someting   What time do you usually get out of bed to start your day? 8am   Do you use an alarm? Yes   2) Weekends/Non-work Days/All Other Days    What time do you usually get into bed? midnight   About how long does it take you to fall asleep? about an hour   What time do you usually get out of bed to start your day? 8am   Do you use an alarm? Yes   SLEEP NEED    On average, about how much sleep do you think you get? 4/5   About how much sleep do you think you need? 7/8   SLEEP POSITION    Which sleep positions do you prefer? Side   Do you do any of the following activities in bed? Watch TV    Use phone, computer, or tablet   How often do you take a nap on purpose? 1/2   About how long are your naps? 30min   Do you feel better after naps? Yes   How often do you doze off unintentionally? 1/2   Have you ever had a driving accident or near-miss due to sleepiness/drowsiness? No     Stop Bang Questionnaire    Question 4/15/2024  10:46 AM CDT - Filed by Patient   Do you SNORE loudly (louder than talking or loud enough to be heard through closed doors)? No   Do you often feel TIRED, fatigued, or sleepy during daytime? Yes   Has anyone OBSERVED you stop breathing during your sleep? No   Do you have or are you being treated for high blood PRESSURE? No   BMI more than 35kg/m2? No   AGE over 50 years old? No   NECK circumference > 16 inches (40cm)? No   GENDER: Male? Yes   STOP-BANG Total Score (range: 0 - 8) 1 (Low risk of MASON)       ANTHONY:  ANTHONY Total Score: 17  Total score - [place]: 12 (4/15/2024 10:45 AM)    Patient told to return in one week after the sleep study is interpreted.    Patient Active Problem List   Diagnosis    Anal dysplasia    Acquired deformity of nose    Adjustment disorder with mixed anxiety and depressed mood    Allergic rhinitis due to pollen    Amnesia    Anxiety    Asthma    Attention deficit hyperactivity disorder    Cervicalgia    Low back pain    Chronic post-traumatic stress disorder    Migraine with aura, not intractable, without status migrainosus    Demyelinating disease of central nervous system, unspecified (H)    Gastroesophageal reflux disease    Exposure to potentially hazardous substance    Personal history of traumatic brain injury     No past medical history on file.  Past Surgical History:   Procedure Laterality Date    EXAM UNDER ANESTHESIA ANUS      EXAM UNDER ANESTHESIA RECTUM, ANOSCOPY, HIGH RESOLUTION, WITH CONDYLOMA FULGURATION N/A 2/29/2024    Procedure: EXAM UNDER ANESTHESIA, RECTUM, ANOSCOPY, HIGH RESOLUTION, WITH anal dysplasia FULGURATION, BIOPSIES;  Surgeon: Jerome Reddy MD;  Location: [facility] OR    RECONSTRUCTION OF NOSE       Current Outpatient Medications   Medication Sig Dispense Refill    albuterol (PROAIR HFA/PROVENTIL HFA/VENTOLIN HFA) 108 (90 Base) MCG/ACT inhaler Inhale 2 puffs into the lungs every 6 hours as needed for shortness of breath, wheezing or cough      ARIPiprazole  (ABILIFY) 10 MG tablet Take 10 mg by mouth daily      budesonide-formoterol (SYMBICORT) 160-4.5 MCG/ACT Inhaler Inhale 2 puffs into the lungs 2 times daily      cetirizine (ZYRTEC) 10 MG tablet Take 10 mg by mouth every morning      emtricitabine-tenofovir (TRUVADA) 200-300 MG per tablet Take 1 tablet by mouth every evening      montelukast (SINGULAIR) 10 MG tablet Take 10 mg by mouth at bedtime      omeprazole (PRILOSEC OTC) 20 MG EC tablet Take 20 mg by mouth daily      PARoxetine (PAXIL) 10 MG tablet Take 15 mg by mouth daily (with lunch)      PARoxetine (PAXIL-CR) 37.5 MG 24 hr tablet Take 1 tablet by mouth every morning      TRINTELLIX 20 MG tablet Take 1 tablet by mouth every morning       Doxycycline and Amoxicillin  Social History     Socioeconomic History    Marital status:      Spouse name: Not on file    Number of children: Not on file    Years of education: Not on file    Highest education level: Not on file   Occupational History    Not on file   Tobacco Use    Smoking status: Never     Passive exposure: Never    Smokeless tobacco: Former     Types: Chew     Quit date: 2018    Tobacco comments:     Uses nicotine lozenges   Vaping Use    Vaping status: Never Used   Substance and Sexual Activity    Alcohol use: Not Currently     Comment: Quit 2 yrs ago 02/05    Drug use: Yes     Types: Marijuana     Comment: 2-3 times a week    Sexual activity: Not on file   Other Topics Concern    Not on file   Social History Narrative    Not on file     Social Determinants of Health     Financial Resource Strain: Not on file   Food Insecurity: Not on file   Transportation Needs: Not on file   Physical Activity: Not on file   Stress: Not on file   Social Connections: Not on file   Interpersonal Safety: Not on file   Housing Stability: Not on file     Family History   Problem Relation Age of Onset    Snoring Mother     Anesthesia Reaction No family hx of     Deep Vein Thrombosis (DVT) No family hx of         Visual  Exam:  GEN: NAD,   Head: Normocephalic.  EYES: EOMI  ENT: Oropharynx is clear, Hahn class 4+ airway.   Psych: normal mood, normal affect  Snore test:(+)     Labs/Studies:     CBC WITH AUTO DIFFERENTIAL  Order: [id number]  Component  Ref Range & Units 6 mo ago   WHITE BLOOD COUNT          4.5 - 11.0 thou/cu mm 6.6   RED BLOOD COUNT            4.30 - 5.90 mil/cu mm 4.84   HEMOGLOBIN                13.5 - 17.5 g/dL 14.9   HEMATOCRIT                37.0 - 53.0 % 43.4   MCV                        80 - 100 fL 90   MCH                        26.0 - 34.0 pg 30.8   MCHC                      32.0 - 36.0 g/dL 34.3   RDW                        11.5 - 15.5 % 11.9   PLATELET COUNT            140 - 440 thou/cu mm 273   MPV                        6.5 - 11.0 fL 9.6   NRBC                      % 0.0   ABS NRBC  thou /cu mm 0.0   % NEUT  % 61.0   % LYMPH  % 29.5   % MONO  % 6.5   % EOS  % 2.3   % BASO  % 0.5   % IMMATURE GRAN (METAS,MYELOS,PROS)  % 0.2   ABSOLUTE NEUTROPHILS      1.7 - 7.0 thou/cu mm 4.0   ABSOLUTE LYMPHOCYTES      0.9 - 2.9 thou/cu mm 2.0   ABSOLUTE MONOCYTES        <0.9 thou/cu mm 0.4   ABSOLUTE EOSINOPHILS      <0.5 thou/cu mm 0.2   ABSOLUTE BASOPHILS        <0.3 thou/cu mm 0.0   ABSOLUTE IMMATURE GRANULOCYTES(METAS,MYELOS,PROS)  <0.3 thou/cu mm 0.0   Resulting [facility] LABORAT[facility]       BASIC METABOLIC PANEL  Order: [id number]  Component  Ref Range & Units 6 mo ago   SODIUM  136 - 145 mmol/L 140   POTASSIUM  3.5 - 5.1 mmol/L 4.3   CHLORIDE  98 - 107 mmol/L 104   CO2,TOTAL  22 - 29 mmol/L 27   ANION GAP  5 - 18 9   GLUCOSE  70 - 99 mg/dL 83   CALCIUM  8.6 - 10.0 mg/dL 8.9   BUN  6 - 20 mg/dL 14   CREATININE  0.70 - 1.20 mg/dL 1.05   BUN/CREAT RATIO  10 - 20 13   eGFR  >90 mL/min/1.73m2 >90   Comment: As of 03/15/2022, eGFR is calculated by the CKD-EPI creatinine equation without race adjustment.  eGFR can be influenced by muscle mass, exercise, and diet.  The reported eGFR is an estimation only and is  only applicable if the renal function is stable.   Resulting Agency [facility] LABORATORY       Tyree Overton DO  Board Certified in Internal Medicine and Sleep Medicine    (Note created with Dragon voice recognition and unintended spelling errors and word substitutions may occur)

## 2024-04-25 ENCOUNTER — TELEPHONE (OUTPATIENT)
Dept: SLEEP MEDICINE | Facility: CLINIC | Age: 34
End: 2024-04-25

## 2024-04-25 NOTE — TELEPHONE ENCOUNTER
[facility]. WatchPat completed on 9/10/2019. Sleep Study scanned, see under PROC. Information routed to provider.     Belem Melendez   Clinic Assistant  [employer] [employer]

## 2024-04-30 NOTE — TELEPHONE ENCOUNTER
The patient did not rule in for sleep apnea via the WatchPAT study is the [place] for.  Please advise the patient to proceed with an in lab sleep study as we discussed in clinic.  Thank you.

## 2024-07-16 DIAGNOSIS — K62.82 ANAL DYSPLASIA: Primary | ICD-10-CM

## 2024-07-23 ENCOUNTER — TELEPHONE (OUTPATIENT)
Facility: CLINIC | Age: 34
End: 2024-07-23

## 2024-07-23 NOTE — TELEPHONE ENCOUNTER
Called patient to schedule surgery with Dr. Reddy    Date of Surgery: 9/12    Approximate arrival time given:  Yes    Location of surgery: [facility]    Pre-Op H&P: PAC 8/26    WOC: Not Applicable     Labs: Not Applicable     Post-Op Appt Date w/ NP/PA: NA     Post-Op Appt Date w/ Surgeon: NA     Imaging needed:  No    Bowel Prep:  Yes  2 Fleets Mailed in Packet    Discussed with patient PAC RN will provide arrival time and instructions for surgery at the time of the appointment: [[facility] only]: Yes    Standard Surgery Packet Sent: Yes 07/23/24  via Mail - Standard      Additional Comments:       All patients questions were answered and was instructed to review surgical packet and call back with any questions or concerns.       Yadira Garces on 7/23/2024 at 5:41 PM

## 2024-07-25 NOTE — TELEPHONE ENCOUNTER
FUTURE VISIT INFORMATION      SURGERY INFORMATION:  Date: 9/12/24  [facility]  Surgeon:  Jerome Reddy MD   Anesthesia Type:  mac  Procedure: EXAM UNDER ANESTHESIA, RECTUM, ANOSCOPY, HIGH RESOLUTION, WITH anal dysplasia FULGURATION, BIOPSIES     RECORDS REQUESTED FROM:       Primary Care Provider: [facility] - requested recs/testing

## 2024-08-06 ENCOUNTER — THERAPY VISIT (OUTPATIENT)
Dept: SLEEP MEDICINE | Facility: CLINIC | Age: 34
End: 2024-08-06
Payer: COMMERCIAL

## 2024-08-06 DIAGNOSIS — R06.83 SNORING: ICD-10-CM

## 2024-08-06 DIAGNOSIS — R51.9 SLEEP RELATED HEADACHES: ICD-10-CM

## 2024-08-06 DIAGNOSIS — G47.00 PERSISTENT INSOMNIA: ICD-10-CM

## 2024-08-06 DIAGNOSIS — R53.82 CHRONIC FATIGUE: ICD-10-CM

## 2024-08-06 DIAGNOSIS — F41.9 ANXIETY: ICD-10-CM

## 2024-08-06 DIAGNOSIS — G47.63 SLEEP RELATED BRUXISM: ICD-10-CM

## 2024-08-06 PROCEDURE — 95810 POLYSOM 6/> YRS 4/> PARAM: CPT | Performed by: INTERNAL MEDICINE

## 2024-08-06 ASSESSMENT — SLEEP AND FATIGUE QUESTIONNAIRES
HOW LIKELY ARE YOU TO NOD OFF OR FALL ASLEEP WHILE SITTING AND READING: MODERATE CHANCE OF DOZING
HOW LIKELY ARE YOU TO NOD OFF OR FALL ASLEEP IN A CAR, WHILE STOPPED FOR A FEW MINUTES IN TRAFFIC: MODERATE CHANCE OF DOZING
HOW LIKELY ARE YOU TO NOD OFF OR FALL ASLEEP WHEN YOU ARE A PASSENGER IN A CAR FOR AN HOUR WITHOUT A BREAK: MODERATE CHANCE OF DOZING
HOW LIKELY ARE YOU TO NOD OFF OR FALL ASLEEP WHILE LYING DOWN TO REST IN THE AFTERNOON WHEN CIRCUMSTANCES PERMIT: HIGH CHANCE OF DOZING
HOW LIKELY ARE YOU TO NOD OFF OR FALL ASLEEP WHILE SITTING QUIETLY AFTER LUNCH WITHOUT ALCOHOL: MODERATE CHANCE OF DOZING
HOW LIKELY ARE YOU TO NOD OFF OR FALL ASLEEP WHILE WATCHING TV: MODERATE CHANCE OF DOZING
HOW LIKELY ARE YOU TO NOD OFF OR FALL ASLEEP WHILE SITTING INACTIVE IN A PUBLIC PLACE: MODERATE CHANCE OF DOZING
HOW LIKELY ARE YOU TO NOD OFF OR FALL ASLEEP WHILE SITTING AND TALKING TO SOMEONE: MODERATE CHANCE OF DOZING

## 2024-08-14 LAB — SLPCOMP: NORMAL

## 2024-08-26 ENCOUNTER — PRE VISIT (OUTPATIENT)
Dept: SURGERY | Facility: CLINIC | Age: 34
End: 2024-08-26

## 2024-08-26 ENCOUNTER — ANESTHESIA EVENT (OUTPATIENT)
Dept: SURGERY | Facility: AMBULATORY SURGERY CENTER | Age: 34
End: 2024-08-26
Payer: COMMERCIAL

## 2024-08-26 ENCOUNTER — VIRTUAL VISIT (OUTPATIENT)
Dept: SURGERY | Facility: CLINIC | Age: 34
End: 2024-08-26
Payer: COMMERCIAL

## 2024-08-26 VITALS — HEIGHT: 69 IN | BODY MASS INDEX: 31.84 KG/M2 | WEIGHT: 215 LBS

## 2024-08-26 DIAGNOSIS — K62.82 ANAL DYSPLASIA: ICD-10-CM

## 2024-08-26 DIAGNOSIS — Z01.818 PRE-OP EVALUATION: Primary | ICD-10-CM

## 2024-08-26 PROCEDURE — 99214 OFFICE O/P EST MOD 30 MIN: CPT | Mod: 95 | Performed by: NURSE PRACTITIONER

## 2024-08-26 ASSESSMENT — PAIN SCALES - GENERAL: PAINLEVEL: NO PAIN (0)

## 2024-08-26 ASSESSMENT — ENCOUNTER SYMPTOMS: SEIZURES: 0

## 2024-08-26 ASSESSMENT — LIFESTYLE VARIABLES: TOBACCO_USE: 0

## 2024-08-26 NOTE — PROGRESS NOTES
Abdi is a 33 year old who is being evaluated via a billable video visit.    How would you like to obtain your AVS? [employer]  If the video visit is dropped, the invitation should be resent by: Text to cell phone: [phone]

## 2024-08-26 NOTE — PATIENT INSTRUCTIONS
Preparing for Your Surgery      Name:  Abdi Baer   MRN:  [id number]   :  1990   Today's Date:  2024         Arriving for surgery:  Surgery date:  24  Arrival time:  11.55AM    Restrictions due to COVID 19:    Please maintain social distance.  Masking is optional.      parking is available for anyone with mobility limitations or disabilities. (Monday- Friday 7 am- 5 pm)    Please come to:    [facility]  [address] [place] [id number]    Please check in on the 5th floor at the Ambulatory Surgery Center.      What can I eat or drink?    -  You may eat and drink normally until 8 hours prior to arrival  time. (Until 3.55AM)  -  You may have clear liquids until 2 hours prior to arrival  time. (Until 9.55AM)    Examples of clear liquids:  Water  Clear broth  Juices (apple, white grape, white cranberry  and cider) without pulp  Noncarbonated, powder based beverages  (lemonade and Danish-Aid)  Sodas (Sprite, 7-Up, ginger ale and seltzer)  Coffee or tea (without milk or cream)  Gatorade      Which medicines can I take?    Hold Aspirin for 7 days before surgery.   Hold Multivitamins for 7 days before surgery.  Hold Supplements for 7 days before surgery.  Hold Ibuprofen (Advil, Motrin) for 1 day before surgery--unless otherwise directed by surgeon.  Hold Naproxen (Aleve) for 4 days before surgery.    No alcohol or cannabis products for 24 hours prior to procedure.      -  DO NOT take the following medications the day of surgery:  Cetirizine (Zyrtec)     -  PLEASE TAKE the following medications the day of surgery:   Albuterol inhaler as needed  Abilify  Symbicort inhaler  Prilosec  Paxil  Trintellix    How do I prepare myself?  - Please take 2 showers (one the night prior to surgery and one the morning of surgery) using Scrubcare or Hibiclens soap.    Use this soap only from the neck to your toes.     Leave the soap on your skin for one minute--then rinse  thoroughly.      You may use your own shampoo and conditioner. No other hair products.   - Please remove all jewelry and body piercings.  - No lotions, deodorants or fragrance.  - No makeup or fingernail polish.   - Bring your ID and insurance card.    -If you have a Deep Brain Stimulator, a Spinal Cord Stimulator, or any implanted Neuro Device, you must bring the remote to the Surgery Center.         ALL PATIENTS ARE REQUIRED TO HAVE A RESPONSIBLE ADULT TO DRIVE AND BE IN ATTENDANCE WITH THEM FOR 24 HOURS FOLLOWING SURGERY.     Covid testing policy as of 12/06/2022  Your surgeon will notify and schedule you for a COVID test if one is needed before surgery--please direct any questions or COVID symptoms to your surgeon      Questions or Concerns:    -For questions regarding the day of surgery, please contact the Ambulatory Surgery Center at [phone].    -If you have health changes between today and your surgery, please contact your surgeon.     - For questions after surgery, please contact your surgeon's office.

## 2024-08-26 NOTE — H&P
Pre-Operative H & P     CC:  Preoperative exam to assess for increased cardiopulmonary risk while undergoing surgery and anesthesia.    Date of Encounter: 8/26/2024  Primary Care Physician:  [place], [facility]     Reason for visit:   Encounter Diagnoses   Name Primary?    Pre-op evaluation Yes    Anal dysplasia        HPI  Abdi JUSTIN Baer is a 33 year old male who presents for pre-operative H & P in preparation for  Procedure Information       Case: [id number] Date/Time: 09/12/24 1325    Procedure: EXAM UNDER ANESTHESIA, RECTUM, ANOSCOPY, HIGH RESOLUTION, WITH ANAL DYSPLASIA FULGURATION, BIOPSIES (Rectum)    Anesthesia type: MAC    Diagnosis: Anal dysplasia [K62.82]    Pre-op diagnosis: Anal dysplasia [K62.82]    Location: [place] [address] / [facility] [place]    Providers: Jerome Reddy MD            The patient presents to the PAC virtually today in preparation for the above scheduled procedure with comorbid conditions including asthma, allergic rhinitis, migraines, h/o TBI, GERD, PTSD, ADHD, anxiety and depression.     Mr. Hunter Baer has a known history of anal dysplasia and is followed by Dr. Reddy's team in Colon and Rectal Surgery. He is s/p EUA with biopsy and fulguration 2/29/2024 with Dr. Reddy.   The patient has been scheduled for the procedure as listed above for ongoing management of anal dysplasia.        History is obtained from the patient and chart review    Hx of abnormal bleeding or anti-platelet use: denies      Past Medical History  Past Medical History:   Diagnosis Date    Anal dysplasia     Anxiety     Asthma     Depression     Gastroesophageal reflux disease with esophagitis     Obesity     PTSD (post-traumatic stress disorder)     Seasonal allergic rhinitis        Past Surgical History  Past Surgical History:   Procedure Laterality Date    EXAM UNDER ANESTHESIA ANUS      EXAM UNDER ANESTHESIA RECTUM,  ANOSCOPY, HIGH RESOLUTION, WITH CONDYLOMA FULGURATION N/A 2/29/2024    Procedure: EXAM UNDER ANESTHESIA, RECTUM, ANOSCOPY, HIGH RESOLUTION, WITH anal dysplasia FULGURATION, BIOPSIES;  Surgeon: Jerome Reddy MD;  Location: [facility] OR    RECONSTRUCTION OF NOSE         Prior to Admission Medications  Current Outpatient Medications   Medication Sig Dispense Refill    albuterol (PROAIR HFA/PROVENTIL HFA/VENTOLIN HFA) 108 (90 Base) MCG/ACT inhaler Inhale 2 puffs into the lungs every 6 hours as needed for shortness of breath, wheezing or cough      ARIPiprazole (ABILIFY) 10 MG tablet Take 10 mg by mouth every morning.      budesonide-formoterol (SYMBICORT) 160-4.5 MCG/ACT Inhaler Inhale 2 puffs into the lungs 2 times daily      cetirizine (ZYRTEC) 10 MG tablet Take 10 mg by mouth every morning      emtricitabine-tenofovir (TRUVADA) 200-300 MG per tablet Take 1 tablet by mouth every evening      Erenumab-aooe (AIMOVIG SC) Inject subcutaneously. Monthly      montelukast (SINGULAIR) 10 MG tablet Take 10 mg by mouth at bedtime      omeprazole (PRILOSEC OTC) 20 MG EC tablet Take 20 mg by mouth every morning.      PARoxetine (PAXIL) 10 MG tablet Take 15 mg by mouth daily (with lunch)      PARoxetine (PAXIL-CR) 37.5 MG 24 hr tablet Take 1 tablet by mouth every morning      TRINTELLIX 20 MG tablet Take 1 tablet by mouth every morning         Allergies  Allergies   Allergen Reactions    Doxycycline Nausea and Vomiting    Amoxicillin Rash     Has taken cefitraxone before with out reaction. Per patient. 3/11/16   Treated with Bicillin IM with no reaction 6-[facility]       Social History  Social History     Socioeconomic History    Marital status:      Spouse name: Not on file    Number of children: Not on file    Years of education: Not on file    Highest education level: Not on file   Occupational History    Not on file   Tobacco Use    Smoking status: Never     Passive exposure: Never    Smokeless tobacco: Former      Types: Chew     Quit date: 2018    Tobacco comments:     Uses nicotine lozenges   Vaping Use    Vaping status: Never Used   Substance and Sexual Activity    Alcohol use: Not Currently     Comment: Quit 2 yrs ago 02/05    Drug use: Yes     Types: Marijuana     Comment: 2-3 times a week. THC klaudia    Sexual activity: Not on file   Other Topics Concern    Not on file   Social History Narrative    Not on file     Social Determinants of Health     Financial Resource Strain: Not on file   Food Insecurity: Not on file   Transportation Needs: Not on file   Physical Activity: Not on file   Stress: Not on file   Social Connections: Not on file   Interpersonal Safety: Not on file   Housing Stability: Not on file       Family History  Family History   Problem Relation Age of Onset    Snoring Mother     Anesthesia Reaction No family hx of     Deep Vein Thrombosis (DVT) No family hx of        Review of Systems  The complete review of systems is negative other than noted in the HPI or here.   Anesthesia Evaluation   Pt has had prior anesthetic. Type: MAC and General.    No history of anesthetic complications       ROS/MED HX  ENT/Pulmonary:     (+)           allergic rhinitis,          Intermittent, asthma  Treatment: Inhaler prn,              (-) tobacco use and MASON risk factors   Neurologic:     (+)      migraines (Well controlled with monthly injections.),                       (-) no seizures, no CVA and no TIA   Cardiovascular: Comment: Denies cardiac symptoms including chest pain, SOB, palpitations, syncope, DE LA O, orthopnea, or PND.       (+) Dyslipidemia - -   -  - -                                   (-) taking anticoagulants/antiplatelets   METS/Exercise Tolerance: >4 METS Comment: No dedicated exercise but stays active    Hematologic:    (-) history of blood clots, anemia and history of blood transfusion   Musculoskeletal:  - neg musculoskeletal ROS     GI/Hepatic:     (+) GERD, Asymptomatic on medication,                (-) liver disease   Renal/Genitourinary:    (-) renal disease   Endo:     (+)               Obesity,    (-) Type I DM, Type II DM, thyroid disease and chronic steroid usage   Psychiatric/Substance Use:     (+) psychiatric history anxiety, depression and other (comment) alcohol abuse (Sober for three years.) H/O chronic opiod use  (Marijuana _ edibles).     Infectious Disease:  - neg infectious disease ROS     Malignancy:  - neg malignancy ROS  (-) malignancy   Other:            Virtual visit -  No vitals were obtained    Physical Exam  Constitutional: Awake, alert, cooperative, no apparent distress, and appears stated age.  Eyes: Pupils equal  HENT: Normocephalic  Respiratory: non labored breathing   Neurologic: Awake, alert, oriented to name, place and time.   Neuropsychiatric: Calm, cooperative. Normal affect.      Prior Labs/Diagnostic Studies   All labs and imaging personally reviewed   BASIC METABOLIC PANEL  Specimen: Blood - Blood specimen (specimen)  Component  Ref Range & Units 10 mo ago Comments   SODIUM  136 - 145 mmol/L 140    POTASSIUM  3.5 - 5.1 mmol/L 4.3    CHLORIDE  98 - 107 mmol/L 104    CO2,TOTAL  22 - 29 mmol/L 27    ANION GAP  5 - 18 9    GLUCOSE  70 - 99 mg/dL 83    CALCIUM  8.6 - 10.0 mg/dL 8.9    BUN  6 - 20 mg/dL 14    CREATININE  0.70 - 1.20 mg/dL 1.05    BUN/CREAT RATIO  10 - 20 13    eGFR  >90 mL/min/1.73m2 >90 As of 03/15/2022, eGFR is calculated by the CKD-EPI creatinine equation without race adjustment.  eGFR can be influenced by muscle mass, exercise, and diet.  The reported eGFR is an estimation only and is only applicable if the renal function is stable.   Resulting [facility] LABORATORY    Specimen Collected: 10/14/23  6:17 PM     CBC WITH AUTO DIFFERENTIAL  Specimen: Blood - Blood specimen (specimen)  Component  Ref Range & Units 10 mo ago   WHITE BLOOD COUNT          4.5 - 11.0 thou/cu mm 6.6   RED BLOOD COUNT            4.30 - 5.90 mil/cu mm 4.84   HEMOGLOBIN                 13.5 - 17.5 g/dL 14.9   HEMATOCRIT                37.0 - 53.0 % 43.4   MCV                        80 - 100 fL 90   MCH                        26.0 - 34.0 pg 30.8   MCHC                      32.0 - 36.0 g/dL 34.3   RDW                        11.5 - 15.5 % 11.9   PLATELET COUNT            140 - 440 thou/cu mm 273   MPV                        6.5 - 11.0 fL 9.6   NRBC                      % 0.0   ABS NRBC  thou /cu mm 0.0   % NEUT  % 61.0   % LYMPH  % 29.5   % MONO  % 6.5   % EOS  % 2.3   % BASO  % 0.5   % IMMATURE GRAN (METAS,MYELOS,PROS)  % 0.2   ABSOLUTE NEUTROPHILS      1.7 - 7.0 thou/cu mm 4.0   ABSOLUTE LYMPHOCYTES      0.9 - 2.9 thou/cu mm 2.0   ABSOLUTE MONOCYTES        <0.9 thou/cu mm 0.4   ABSOLUTE EOSINOPHILS      <0.5 thou/cu mm 0.2   ABSOLUTE BASOPHILS        <0.3 thou/cu mm 0.0   ABSOLUTE IMMATURE GRANULOCYTES(METAS,MYELOS,PROS)  <0.3 thou/cu mm 0.0   Resulting Agency [facility] LABORATORY   Specimen Collected: 10/14/23  6:17 PM     EKG/ stress test - if available please see in ROS above   No results found.       No data to display                  The patient's records and results personally reviewed by this provider.     Outside records reviewed from: Care Everywhere      Assessment    Abdi Baer is a 33 year old male seen as a PAC referral for risk assessment and optimization for anesthesia.    Plan/Recommendations  Pt will be optimized for the proposed procedure.  See below for details on the assessment, risk, and preoperative recommendations    NEUROLOGY  - No history of TIA, CVA or seizure    - H/o TBI while in  with subsequent migraines   ~ follows in the [facility]   ~ managed with Botox Q 3 months and Aimovig  monthly   ~ reports well controlled on the above medications    -Post Op delirium risk factors:  No risk identified    ENT  - No current airway concerns.  Will need to be reassessed day of surgery.  Mallampati: Unable to assess  TM: Unable to assess    CARDIAC  -  "No history of CAD, Hypertension, and Afib    -  Denies cardiac symptoms.    - METS (Metabolic Equivalents)  Patient performs 4 or more METS exercise without symptoms             Total Score: 0      RCRI-Very low risk: Class 1 0.4% complication rate             Total Score: 0        PULMONARY  - MASON Low Risk             Total Score: 1    MASON: Male      - Asthma and seasonal allergies   ~ takes Montelukast and Zyrtec scheduled   ~ uses albuterol and Symbicort as needed>>has not been using as well controlled      - Tobacco History    History   Smoking Status    Never   Smokeless Tobacco    Former    Types: Chew    Quit date: 2018       GI  - GERD   ~ well controlled on PPI   ~ continue PPI DOs    PONV Low Risk  Total Score: 1           1 AN PONV: Patient is not a current smoker        /RENAL  - Baseline Creatinine  see above     ENDOCRINE    - BMI: Estimated body mass index is 31.75 kg/m  as calculated from the following:    Height as of this encounter: 1.753 m (5' 9\").    Weight as of this encounter: 97.5 kg (215 lb).  Obesity (BMI >30)    - No history of Diabetes Mellitus    HEME  VTE Low Risk 0.5%             Total Score: 2    VTE: Male      - No history of abnormal bleeding or antiplatelet use.    - Denies a h/o anemia or previous blood transfusion    ID  - Truvada prophylaxis    MSK  Patient is NOT Frail             Total Score: 0        PSYCH  - PTSD, ADHD, depression and anxiety   ~ Follows at [facility] with psychiatry and therapist   ~ Reports doing well with therapist and medical management of Abilify, Paxil and Trintellix    - h/o Alcohol use disorder   ~ in remission X3 years    - Marijuana , edibles occasionally    Different anesthesia methods/types have been discussed with the patient, but they are aware that the final plan will be decided by the assigned anesthesia provider on the date of service.      The patient is optimized for their procedure. AVS with information on surgery time/arrival time, meds and " NPO status given by nursing staff. No further diagnostic testing indicated.    Please refer to the physical examination documented by the anesthesiologist in the anesthesia record on the day of surgery.    Video-Visit Details    Type of service:  Video Visit    Provider received verbal consent for a Video Visit from the patient? Yes     Originating Location (pt. Location): Home    Distant Location (provider location):  Off-site  Mode of Communication:  Video Conference via [employer]  On the day of service:     Prep time: 12 minutes  Visit time: 11 minutes  Documentation time: 12 minutes  ------------------------------------------  Total time: 35 minutes      SCOTT Felipe CNP  Preoperative Assessment Center  [facility]  Clinic and Surgery Center  Phone: [phone]  Fax: [phone]

## 2024-09-11 ENCOUNTER — OFFICE VISIT (OUTPATIENT)
Dept: SLEEP MEDICINE | Facility: CLINIC | Age: 34
End: 2024-09-11
Payer: COMMERCIAL

## 2024-09-11 VITALS
SYSTOLIC BLOOD PRESSURE: 124 MMHG | DIASTOLIC BLOOD PRESSURE: 77 MMHG | HEIGHT: 69 IN | BODY MASS INDEX: 32.7 KG/M2 | HEART RATE: 93 BPM | OXYGEN SATURATION: 96 % | WEIGHT: 220.8 LBS | RESPIRATION RATE: 16 BRPM

## 2024-09-11 DIAGNOSIS — G47.63 SLEEP RELATED BRUXISM: ICD-10-CM

## 2024-09-11 DIAGNOSIS — G47.31 CENTRAL SLEEP APNEA: Primary | ICD-10-CM

## 2024-09-11 DIAGNOSIS — G47.00 PERSISTENT INSOMNIA: ICD-10-CM

## 2024-09-11 PROCEDURE — 99213 OFFICE O/P EST LOW 20 MIN: CPT | Performed by: INTERNAL MEDICINE

## 2024-09-11 RX ORDER — FENTANYL CITRATE 50 UG/ML
25 INJECTION, SOLUTION INTRAMUSCULAR; INTRAVENOUS
Status: CANCELLED | OUTPATIENT
Start: 2024-09-11

## 2024-09-11 RX ORDER — OXYCODONE HYDROCHLORIDE 5 MG/1
10 TABLET ORAL
Status: CANCELLED | OUTPATIENT
Start: 2024-09-11

## 2024-09-11 RX ORDER — OXYCODONE HYDROCHLORIDE 5 MG/1
5 TABLET ORAL
Status: CANCELLED | OUTPATIENT
Start: 2024-09-11

## 2024-09-11 RX ORDER — ESZOPICLONE 1 MG/1
1 TABLET, FILM COATED ORAL AT BEDTIME
COMMUNITY

## 2024-09-11 RX ORDER — DEXAMETHASONE SODIUM PHOSPHATE 10 MG/ML
4 INJECTION, SOLUTION INTRAMUSCULAR; INTRAVENOUS
Status: CANCELLED | OUTPATIENT
Start: 2024-09-11

## 2024-09-11 RX ORDER — NALOXONE HYDROCHLORIDE 0.4 MG/ML
0.1 INJECTION, SOLUTION INTRAMUSCULAR; INTRAVENOUS; SUBCUTANEOUS
Status: CANCELLED | OUTPATIENT
Start: 2024-09-11

## 2024-09-11 RX ORDER — ONDANSETRON 4 MG/1
4 TABLET, ORALLY DISINTEGRATING ORAL EVERY 30 MIN PRN
Status: CANCELLED | OUTPATIENT
Start: 2024-09-11

## 2024-09-11 RX ORDER — ONDANSETRON 2 MG/ML
4 INJECTION INTRAMUSCULAR; INTRAVENOUS EVERY 30 MIN PRN
Status: CANCELLED | OUTPATIENT
Start: 2024-09-11

## 2024-09-11 NOTE — PATIENT INSTRUCTIONS
"Stimulus control    Stimulus control therapy is based on the idea that some people with poor sleep habits have learned to associate the bed with staying awake rather than sleeping.  ?You should spend no more than 15 minutes lying in bed trying to fall asleep.  ?If you cannot fall asleep within 15 minutes, bring a chair and put it next to your bed and sit in that chair. Try to close your eyes in the dark and focus on your breathing. Activities such as eating, balancing your checkbook, doing housework, or studying for a test, which \"reward\" you for staying awake, should be avoided.  ?When you start to feel sleepy, you can return to bed. If you cannot fall asleep in another 20 minutes, repeat the process.  .      "

## 2024-09-11 NOTE — PROGRESS NOTES
Additional 10 minutes on the date of service was spent performing the following:    -Preparing to see the patient  -Ordering medications, tests, or procedures   -Documenting clinical information in the electronic or other health record     Thank you for the opportunity to participate in the care of Abdi Baer.     He is a 33 year old  male patient who comes to the sleep medicine clinic for review of sleep study results. The patient had a sleep study performed on 08/06/2024 (AHI=8.5). More than 50% of the the patient's respiratory events were central in nature. Most of the central apnea were transitional in nature. Sleep related bruxism appreciated. Borderline REM sleep without atonia.  Patient reported that he did take melatonin on the night of the study and he felt relatively rested upon awakening after the sleep study.    Assessment and Plan:  In summary Abdi Baer is a 33 year old year old male here for review of sleep study results.  1. Central sleep apnea  I offered the patient the option of getting an in lab titration study but he declined at this time.  If he changes his mind he will contact us and let us know.    2. Persistent insomnia  I recommend the patient institute proper stimulus control and will give him a handout on this topic.    3. Sleep related bruxism  The patient already has a mouthguard that he wears at night during sleep.    He ANTHONY:  ANTHONY Total Score: 11  Total score - [place]: 16 (9/11/2024  9:00 AM)    Patient Active Problem List   Diagnosis    Anal dysplasia    Acquired deformity of nose    Adjustment disorder with mixed anxiety and depressed mood    Allergic rhinitis due to pollen    Amnesia    Anxiety    Asthma    Attention deficit hyperactivity disorder    Cervicalgia    Low back pain    Chronic post-traumatic stress disorder    Migraine with aura, not intractable, without status migrainosus    Demyelinating disease of central nervous system, unspecified (H)     "Gastroesophageal reflux disease    Exposure to potentially hazardous substance    Personal history of traumatic brain injury       Current Outpatient Medications   Medication Sig Dispense Refill    albuterol (PROAIR HFA/PROVENTIL HFA/VENTOLIN HFA) 108 (90 Base) MCG/ACT inhaler Inhale 2 puffs into the lungs every 6 hours as needed for shortness of breath, wheezing or cough      ARIPiprazole (ABILIFY) 10 MG tablet Take 10 mg by mouth every morning.      botulinum toxin type A (BOTOX) 100 units injection Inject into the muscle once.      budesonide-formoterol (SYMBICORT) 160-4.5 MCG/ACT Inhaler Inhale 2 puffs into the lungs 2 times daily      cetirizine (ZYRTEC) 10 MG tablet Take 10 mg by mouth every morning      emtricitabine-tenofovir (TRUVADA) 200-300 MG per tablet Take 1 tablet by mouth every evening      Erenumab-aooe (AIMOVIG SC) Inject subcutaneously. Monthly      eszopiclone (LUNESTA) 1 MG tablet Take 1 mg by mouth at bedtime.      montelukast (SINGULAIR) 10 MG tablet Take 10 mg by mouth at bedtime      omeprazole (PRILOSEC OTC) 20 MG EC tablet Take 20 mg by mouth every morning.      PARoxetine (PAXIL) 10 MG tablet Take 15 mg by mouth daily (with lunch)      PARoxetine (PAXIL-CR) 37.5 MG 24 hr tablet Take 1 tablet by mouth every morning      TRINTELLIX 20 MG tablet Take 1 tablet by mouth every morning         Allergies   Allergen Reactions    Doxycycline Nausea and Vomiting    Amoxicillin Rash     Has taken cefitraxone before with out reaction. Per patient. 3/11/16   Treated with Bicillin IM with no reaction [facility] at [facility]     Physical Exam:  /77   Pulse 93   Resp 16   Ht 1.753 m (5' 9\")   Wt 100.2 kg (220 lb 12.8 oz)   SpO2 96%   BMI 32.61 kg/m    BMI:Body mass index is 32.61 kg/m .   GEN: NAD,   Head: Normocephalic.  EYES:  EOMI  Psych: normal mood, normal affect     Labs/Studies:  - We reviewed the results of the overnight study as described on the HPI.         Patient verbalized understanding of " these issues, agrees with the plan and all questions were answered today. Patient was given an opportuntity to voice any other symptoms or concerns not listed above. Patient did not have any other symptoms or concerns.      Tyree Overton DO  Board Certified in Internal Medicine and Sleep Medicine      (Note created with Dragon voice recognition and unintended spelling errors and word substitutions may occur)

## 2024-09-11 NOTE — NURSING NOTE
"Chief Complaint   Patient presents with    Sleep Study       Initial /77   Pulse 93   Resp 16   Ht 1.753 m (5' 9\")   Wt 100.2 kg (220 lb 12.8 oz)   SpO2 96%   BMI 32.61 kg/m   Estimated body mass index is 32.61 kg/m  as calculated from the following:    Height as of this encounter: 1.753 m (5' 9\").    Weight as of this encounter: 100.2 kg (220 lb 12.8 oz).    Medication Reconciliation: complete  ESS: 16  Neck circumference: 16.50 inches / 42 centimeters.  DME: N/A  Preethi Alfred CMA      "

## 2024-09-12 ENCOUNTER — HOSPITAL ENCOUNTER (OUTPATIENT)
Facility: AMBULATORY SURGERY CENTER | Age: 34
Discharge: HOME | End: 2024-09-12
Attending: SURGERY
Payer: COMMERCIAL

## 2024-09-12 ENCOUNTER — ANESTHESIA (OUTPATIENT)
Dept: SURGERY | Facility: AMBULATORY SURGERY CENTER | Age: 34
End: 2024-09-12
Payer: COMMERCIAL

## 2024-09-12 VITALS
HEART RATE: 73 BPM | HEIGHT: 69 IN | TEMPERATURE: 97.2 F | BODY MASS INDEX: 32.7 KG/M2 | SYSTOLIC BLOOD PRESSURE: 103 MMHG | WEIGHT: 220.8 LBS | RESPIRATION RATE: 15 BRPM | OXYGEN SATURATION: 97 % | DIASTOLIC BLOOD PRESSURE: 66 MMHG

## 2024-09-12 DIAGNOSIS — K62.82 ANAL DYSPLASIA: Primary | ICD-10-CM

## 2024-09-12 PROCEDURE — 88342 IMHCHEM/IMCYTCHM 1ST ANTB: CPT | Mod: TC | Performed by: SURGERY

## 2024-09-12 PROCEDURE — 46601 DIAGNOSTIC ANOSCOPY: CPT | Performed by: NURSE ANESTHETIST, CERTIFIED REGISTERED

## 2024-09-12 PROCEDURE — 88342 IMHCHEM/IMCYTCHM 1ST ANTB: CPT | Mod: 26 | Performed by: STUDENT IN AN ORGANIZED HEALTH CARE EDUCATION/TRAINING PROGRAM

## 2024-09-12 PROCEDURE — 88341 IMHCHEM/IMCYTCHM EA ADD ANTB: CPT | Mod: 26 | Performed by: STUDENT IN AN ORGANIZED HEALTH CARE EDUCATION/TRAINING PROGRAM

## 2024-09-12 PROCEDURE — 88305 TISSUE EXAM BY PATHOLOGIST: CPT | Mod: 26 | Performed by: STUDENT IN AN ORGANIZED HEALTH CARE EDUCATION/TRAINING PROGRAM

## 2024-09-12 PROCEDURE — 46601 DIAGNOSTIC ANOSCOPY: CPT | Performed by: ANESTHESIOLOGY

## 2024-09-12 PROCEDURE — 46924 DESTRUCTION ANAL LESION(S): CPT

## 2024-09-12 PROCEDURE — 88112 CYTOPATH CELL ENHANCE TECH: CPT | Mod: TC | Performed by: SURGERY

## 2024-09-12 PROCEDURE — 88112 CYTOPATH CELL ENHANCE TECH: CPT | Mod: 26 | Performed by: PATHOLOGY

## 2024-09-12 RX ORDER — ONDANSETRON 2 MG/ML
4 INJECTION INTRAMUSCULAR; INTRAVENOUS ONCE
Status: COMPLETED | OUTPATIENT
Start: 2024-09-12 | End: 2024-09-12

## 2024-09-12 RX ORDER — OXYCODONE HYDROCHLORIDE 5 MG/1
5 TABLET ORAL
Status: DISCONTINUED | OUTPATIENT
Start: 2024-09-12 | End: 2024-09-13 | Stop reason: HOSPADM

## 2024-09-12 RX ORDER — ACETAMINOPHEN 325 MG/1
975 TABLET ORAL ONCE
Status: COMPLETED | OUTPATIENT
Start: 2024-09-12 | End: 2024-09-12

## 2024-09-12 RX ORDER — GLYCOPYRROLATE 0.2 MG/ML
INJECTION, SOLUTION INTRAMUSCULAR; INTRAVENOUS PRN
Status: DISCONTINUED | OUTPATIENT
Start: 2024-09-12 | End: 2024-09-12

## 2024-09-12 RX ORDER — LIDOCAINE HYDROCHLORIDE 20 MG/ML
INJECTION, SOLUTION INFILTRATION; PERINEURAL PRN
Status: DISCONTINUED | OUTPATIENT
Start: 2024-09-12 | End: 2024-09-12

## 2024-09-12 RX ORDER — IODINE AND POTASSIUM IODIDE 50; 100 MG/ML; MG/ML
LIQUID ORAL PRN
Status: DISCONTINUED | OUTPATIENT
Start: 2024-09-12 | End: 2024-09-12 | Stop reason: HOSPADM

## 2024-09-12 RX ORDER — OXYCODONE HYDROCHLORIDE 5 MG/1
5-10 TABLET ORAL EVERY 4 HOURS PRN
Qty: 10 TABLET | Refills: 0 | Status: SHIPPED | OUTPATIENT
Start: 2024-09-12

## 2024-09-12 RX ORDER — LIDOCAINE 40 MG/G
CREAM TOPICAL
Status: DISCONTINUED | OUTPATIENT
Start: 2024-09-12 | End: 2024-09-12 | Stop reason: HOSPADM

## 2024-09-12 RX ORDER — PROPOFOL 10 MG/ML
INJECTION, EMULSION INTRAVENOUS CONTINUOUS PRN
Status: DISCONTINUED | OUTPATIENT
Start: 2024-09-12 | End: 2024-09-12

## 2024-09-12 RX ORDER — KETOROLAC TROMETHAMINE 30 MG/ML
INJECTION, SOLUTION INTRAMUSCULAR; INTRAVENOUS PRN
Status: DISCONTINUED | OUTPATIENT
Start: 2024-09-12 | End: 2024-09-12

## 2024-09-12 RX ORDER — PROPOFOL 10 MG/ML
INJECTION, EMULSION INTRAVENOUS PRN
Status: DISCONTINUED | OUTPATIENT
Start: 2024-09-12 | End: 2024-09-12

## 2024-09-12 RX ORDER — SODIUM CHLORIDE, SODIUM LACTATE, POTASSIUM CHLORIDE, CALCIUM CHLORIDE 600; 310; 30; 20 MG/100ML; MG/100ML; MG/100ML; MG/100ML
INJECTION, SOLUTION INTRAVENOUS CONTINUOUS
Status: DISCONTINUED | OUTPATIENT
Start: 2024-09-12 | End: 2024-09-12 | Stop reason: HOSPADM

## 2024-09-12 RX ORDER — ACETAMINOPHEN 325 MG/1
650 TABLET ORAL
Status: DISCONTINUED | OUTPATIENT
Start: 2024-09-12 | End: 2024-09-13 | Stop reason: HOSPADM

## 2024-09-12 RX ORDER — KETAMINE HYDROCHLORIDE 10 MG/ML
INJECTION INTRAMUSCULAR; INTRAVENOUS PRN
Status: DISCONTINUED | OUTPATIENT
Start: 2024-09-12 | End: 2024-09-12

## 2024-09-12 RX ORDER — BUPIVACAINE HYDROCHLORIDE AND EPINEPHRINE 2.5; 5 MG/ML; UG/ML
INJECTION, SOLUTION EPIDURAL; INFILTRATION; INTRACAUDAL; PERINEURAL PRN
Status: DISCONTINUED | OUTPATIENT
Start: 2024-09-12 | End: 2024-09-12 | Stop reason: HOSPADM

## 2024-09-12 RX ORDER — ACETIC ACID 5 %
LIQUID (ML) MISCELLANEOUS PRN
Status: DISCONTINUED | OUTPATIENT
Start: 2024-09-12 | End: 2024-09-12 | Stop reason: HOSPADM

## 2024-09-12 RX ORDER — GINSENG 100 MG
CAPSULE ORAL PRN
Status: DISCONTINUED | OUTPATIENT
Start: 2024-09-12 | End: 2024-09-12 | Stop reason: HOSPADM

## 2024-09-12 RX ORDER — FENTANYL CITRATE 50 UG/ML
INJECTION, SOLUTION INTRAMUSCULAR; INTRAVENOUS PRN
Status: DISCONTINUED | OUTPATIENT
Start: 2024-09-12 | End: 2024-09-12

## 2024-09-12 RX ADMIN — ACETAMINOPHEN 975 MG: 325 TABLET ORAL at 11:21

## 2024-09-12 RX ADMIN — LIDOCAINE HYDROCHLORIDE 100 MG: 20 INJECTION, SOLUTION INFILTRATION; PERINEURAL at 12:32

## 2024-09-12 RX ADMIN — PROPOFOL 100 MCG/KG/MIN: 10 INJECTION, EMULSION INTRAVENOUS at 13:04

## 2024-09-12 RX ADMIN — ONDANSETRON 4 MG: 2 INJECTION INTRAMUSCULAR; INTRAVENOUS at 11:28

## 2024-09-12 RX ADMIN — PROPOFOL 30 MG: 10 INJECTION, EMULSION INTRAVENOUS at 12:35

## 2024-09-12 RX ADMIN — SODIUM CHLORIDE, SODIUM LACTATE, POTASSIUM CHLORIDE, CALCIUM CHLORIDE: 600; 310; 30; 20 INJECTION, SOLUTION INTRAVENOUS at 11:21

## 2024-09-12 RX ADMIN — KETOROLAC TROMETHAMINE 30 MG: 30 INJECTION, SOLUTION INTRAMUSCULAR; INTRAVENOUS at 13:21

## 2024-09-12 RX ADMIN — KETAMINE HYDROCHLORIDE 20 MG: 10 INJECTION INTRAMUSCULAR; INTRAVENOUS at 12:34

## 2024-09-12 RX ADMIN — PROPOFOL 50 MG: 10 INJECTION, EMULSION INTRAVENOUS at 13:08

## 2024-09-12 RX ADMIN — FENTANYL CITRATE 50 MCG: 50 INJECTION, SOLUTION INTRAMUSCULAR; INTRAVENOUS at 12:31

## 2024-09-12 RX ADMIN — FENTANYL CITRATE 25 MCG: 50 INJECTION, SOLUTION INTRAMUSCULAR; INTRAVENOUS at 13:18

## 2024-09-12 RX ADMIN — PROPOFOL 150 MCG/KG/MIN: 10 INJECTION, EMULSION INTRAVENOUS at 12:33

## 2024-09-12 RX ADMIN — KETAMINE HYDROCHLORIDE 20 MG: 10 INJECTION INTRAMUSCULAR; INTRAVENOUS at 13:10

## 2024-09-12 RX ADMIN — FENTANYL CITRATE 25 MCG: 50 INJECTION, SOLUTION INTRAMUSCULAR; INTRAVENOUS at 13:14

## 2024-09-12 RX ADMIN — GLYCOPYRROLATE 0.1 MG: 0.2 INJECTION, SOLUTION INTRAMUSCULAR; INTRAVENOUS at 12:33

## 2024-09-12 ASSESSMENT — LIFESTYLE VARIABLES: TOBACCO_USE: 0

## 2024-09-12 ASSESSMENT — ENCOUNTER SYMPTOMS: SEIZURES: 0

## 2024-09-12 NOTE — DISCHARGE INSTRUCTIONS
[facility]  Home Care Following Anesthesia  For 24 hours after surgery:  Get plenty of rest.  A responsible adult must stay with you for at least 24 hours after you leave the surgery center.  Do not drive or use heavy equipment.  If you have weakness or tingling, don't drive or use heavy equipment until this feeling goes away.   Do not drink alcohol.   Avoid strenuous or risky activities.  Ask for help when climbing stairs.  You may feel lightheaded.  IF so, sit for a few minutes before standing.  Have someone help you get up.   If you have nausea (feel sick to your stomach): Drink only clear liquids such as apple juice, ginger ale, broth or 7-Up.  Rest may also help.  Be sure to drink enough fluids.  Move to a regular diet as you feel able.   You may have a slight fever.  Call the doctor if your fever is over 100 F (37.7 C) (taken under the tongue) or lasts longer than 24 hours.  You may have a dry mouth, a sore throat, muscle aches or trouble sleeping. These should go away after 24 hours.  Do not make important or legal decisions.   It is recommended to avoid smoking.               Tips for taking pain medications  To get the best pain relief possible, remember these points:  Take pain medications as directed, before pain becomes severe.  Pain medication can upset your stomach: taking it with food may help.  Constipation is a common side effect of pain medication. Drink plenty of  fluids.  Eat foods high in fiber. Take a stool softener if recommended by your doctor or pharmacist.  Do not drink alcohol, drive or operate machinery while taking pain medications.  Ask about other ways to control pain, such as with heat, ice or relaxation.    Tylenol/Acetaminophen Consumption    If you feel your pain relief is insufficient, you may take Tylenol/Acetaminophen in addition to your narcotic pain medication.   Be careful not to exceed 4,000 mg of Tylenol/Acetaminophen in a 24 hour  period from all sources.  If you are taking extra strength Tylenol/acetaminophen (500 mg), the maximum dose is 8 tablets in 24 hours.  If you are taking regular strength acetaminophen (325 mg), the maximum dose is 12 tablets in 24 hours.    Call a doctor for any of the following:  Signs of infection (fever, growing tenderness at the surgery site, a large amount of drainage or bleeding, severe pain, foul-smelling drainage, redness, swelling).  It has been over 8 to 10 hours since surgery and you are still not able to urinate (pass water).  Headache for over 24 hours.  Numbness, tingling or weakness the day after surgery (if you had spinal anesthesia).  Signs of Covid-19 infection (temperature over 100 degrees, shortness of breath, cough, loss of taste/smell, generalized body aches, persistent headache, chills, sore throat, nausea/vomiting/diarrhea)  Your doctor is:  Dr. Jerome Reddy, Colon Rectal: [phone]                    Or dial [phone] and ask for the resident on call for:  Colon Rectal  For emergency care, call the:  [facility]:  [phone] (TTY for hearing impaired: [phone])

## 2024-09-12 NOTE — ANESTHESIA PREPROCEDURE EVALUATION
Pre-Operative H & P     CC:  Preoperative exam to assess for increased cardiopulmonary risk while undergoing surgery and anesthesia.    Date of Encounter: 8/26/2024  Primary Care Physician:  [place], [facility]     Reason for visit:   Encounter Diagnosis   Name Primary?    Anal dysplasia Yes       HPI  Abdi Baer is a 33 year old male who presents for pre-operative H & P in preparation for  Procedure Information       Case: [id number] Date/Time: 09/12/24 1215    Procedure: EXAM UNDER ANESTHESIA, RECTUM, ANOSCOPY, HIGH RESOLUTION, WITH ANAL DYSPLASIA FULGURATION, BIOPSIES (Rectum)    Anesthesia type: MAC    Diagnosis: Anal dysplasia [K62.82]    Pre-op diagnosis: Anal dysplasia [K62.82]    Location: [place] [address] / [facility]    Providers: Jerome Reddy MD            The patient presents to the PAC virtually today in preparation for the above scheduled procedure with comorbid conditions including asthma, allergic rhinitis, migraines, h/o TBI, GERD, PTSD, ADHD, anxiety and depression.     Mr. Hunter Baer has a known history of anal dysplasia and is followed by Dr. Reddy's team in Colon and Rectal Surgery. He is s/p EUA with biopsy and fulguration 2/29/2024 with Dr. Reddy.   The patient has been scheduled for the procedure as listed above for ongoing management of anal dysplasia.        History is obtained from the patient and chart review    Hx of abnormal bleeding or anti-platelet use: denies      Past Medical History  Past Medical History:   Diagnosis Date    Anal dysplasia     Anxiety     Asthma     Depression     Gastroesophageal reflux disease with esophagitis     Obesity     PTSD (post-traumatic stress disorder)     Seasonal allergic rhinitis        Past Surgical History  Past Surgical History:   Procedure Laterality Date    EXAM UNDER ANESTHESIA ANUS      EXAM UNDER ANESTHESIA RECTUM, ANOSCOPY, HIGH RESOLUTION,  WITH CONDYLOMA FULGURATION N/A 2/29/2024    Procedure: EXAM UNDER ANESTHESIA, RECTUM, ANOSCOPY, HIGH RESOLUTION, WITH anal dysplasia FULGURATION, BIOPSIES;  Surgeon: Jerome Reddy MD;  Location: [facility] OR    RECONSTRUCTION OF NOSE         Prior to Admission Medications  Current Outpatient Medications   Medication Sig Dispense Refill    albuterol (PROAIR HFA/PROVENTIL HFA/VENTOLIN HFA) 108 (90 Base) MCG/ACT inhaler Inhale 2 puffs into the lungs every 6 hours as needed for shortness of breath, wheezing or cough      ARIPiprazole (ABILIFY) 10 MG tablet Take 10 mg by mouth every morning.      budesonide-formoterol (SYMBICORT) 160-4.5 MCG/ACT Inhaler Inhale 2 puffs into the lungs 2 times daily      cetirizine (ZYRTEC) 10 MG tablet Take 10 mg by mouth every morning      emtricitabine-tenofovir (TRUVADA) 200-300 MG per tablet Take 1 tablet by mouth every evening      Erenumab-aooe (AIMOVIG SC) Inject subcutaneously. Monthly      eszopiclone (LUNESTA) 1 MG tablet Take 1 mg by mouth at bedtime.      montelukast (SINGULAIR) 10 MG tablet Take 10 mg by mouth at bedtime      omeprazole (PRILOSEC OTC) 20 MG EC tablet Take 20 mg by mouth every morning.      oxyCODONE (ROXICODONE) 5 MG tablet Take 1-2 tablets (5-10 mg) by mouth every 4 hours as needed for moderate to severe pain. 10 tablet 0    PARoxetine (PAXIL) 10 MG tablet Take 15 mg by mouth daily (with lunch)      PARoxetine (PAXIL-CR) 37.5 MG 24 hr tablet Take 1 tablet by mouth every morning      TRINTELLIX 20 MG tablet Take 1 tablet by mouth every morning      botulinum toxin type A (BOTOX) 100 units injection Inject into the muscle once.         Allergies  Allergies   Allergen Reactions    Doxycycline Nausea and Vomiting    Amoxicillin Rash     Has taken cefitraxone before with out reaction. Per patient. 3/11/16   Treated with Bicillin IM with no reaction 6-2018 at [facility]       Social History  Social History     Socioeconomic History    Marital status:       Spouse name: Not on file    Number of children: Not on file    Years of education: Not on file    Highest education level: Not on file   Occupational History    Not on file   Tobacco Use    Smoking status: Never     Passive exposure: Never    Smokeless tobacco: Current     Types: Chew     Last attempt to quit: 2018    Tobacco comments:     Uses nicotine lozenges   Vaping Use    Vaping status: Never Used   Substance and Sexual Activity    Alcohol use: Not Currently     Comment: Quit 2 yrs ago 02/05    Drug use: Yes     Types: Marijuana     Comment: 2-3 times a week. THC seltzers    Sexual activity: Not on file   Other Topics Concern    Not on file   Social History Narrative    Not on file     Social Determinants of Health     Financial Resource Strain: Not on file   Food Insecurity: Not on file   Transportation Needs: Not on file   Physical Activity: Not on file   Stress: Not on file   Social Connections: Not on file   Interpersonal Safety: Low Risk  (9/12/2024)    Interpersonal Safety     Do you feel physically and emotionally safe where you currently live?: Yes     Within the past 12 months, have you been hit, slapped, kicked or otherwise physically hurt by someone?: No     Within the past 12 months, have you been humiliated or emotionally abused in other ways by your partner or ex-partner?: No   Housing Stability: Not on file       Family History  Family History   Problem Relation Age of Onset    Snoring Mother     Anesthesia Reaction No family hx of     Deep Vein Thrombosis (DVT) No family hx of        Review of Systems  The complete review of systems is negative other than noted in the HPI or here.   Anesthesia Evaluation    Type: MAC and General.        ROS/MED HX  ENT/Pulmonary:     (+)           allergic rhinitis,          Intermittent, asthma  Treatment: Inhaler prn,              (-) tobacco use and MASON risk factors   Neurologic:     (+)      migraines (Well controlled with monthly injections.),                        (-) no seizures, no CVA and no TIA   Cardiovascular: Comment: Denies cardiac symptoms including chest pain, SOB, palpitations, syncope, DE LA O, orthopnea, or PND.       (+) Dyslipidemia - -   -  - -                                   (-) taking anticoagulants/antiplatelets   METS/Exercise Tolerance: >4 METS Comment: No dedicated exercise but stays active    Hematologic:    (-) history of blood clots, anemia and history of blood transfusion   Musculoskeletal:  - neg musculoskeletal ROS     GI/Hepatic:     (+) GERD, Asymptomatic on medication,               (-) liver disease   Renal/Genitourinary:    (-) renal disease   Endo:     (+)               Obesity,    (-) Type I DM, Type II DM, thyroid disease and chronic steroid usage   Psychiatric/Substance Use:     (+) psychiatric history anxiety, depression and other (comment) alcohol abuse (Sober for three years.) H/O chronic opiod use  (Marijuana _ edibles).     Infectious Disease:  - neg infectious disease ROS     Malignancy:  - neg malignancy ROS  (-) malignancy   Other:            Virtual visit -  No vitals were obtained    Physical Exam  Constitutional: Awake, alert, cooperative, no apparent distress, and appears stated age.  Eyes: Pupils equal  HENT: Normocephalic  Respiratory: non labored breathing   Neurologic: Awake, alert, oriented to name, place and time.   Neuropsychiatric: Calm, cooperative. Normal affect.      Prior Labs/Diagnostic Studies   All labs and imaging personally reviewed   BASIC METABOLIC PANEL  Specimen: Blood - Blood specimen (specimen)  Component  Ref Range & Units 10 mo ago Comments   SODIUM  136 - 145 mmol/L 140    POTASSIUM  3.5 - 5.1 mmol/L 4.3    CHLORIDE  98 - 107 mmol/L 104    CO2,TOTAL  22 - 29 mmol/L 27    ANION GAP  5 - 18 9    GLUCOSE  70 - 99 mg/dL 83    CALCIUM  8.6 - 10.0 mg/dL 8.9    BUN  6 - 20 mg/dL 14    CREATININE  0.70 - 1.20 mg/dL 1.05    BUN/CREAT RATIO  10 - 20 13    eGFR  >90 mL/min/1.73m2 >90 As of 03/15/2022, eGFR  is calculated by the CKD-EPI creatinine equation without race adjustment.  eGFR can be influenced by muscle mass, exercise, and diet.  The reported eGFR is an estimation only and is only applicable if the renal function is stable.   Resulting [facility] LABORATORY    Specimen Collected: 10/14/23  6:17 PM     CBC WITH AUTO DIFFERENTIAL  Specimen: Blood - Blood specimen (specimen)  Component  Ref Range & Units 10 mo ago   WHITE BLOOD COUNT          4.5 - 11.0 thou/cu mm 6.6   RED BLOOD COUNT            4.30 - 5.90 mil/cu mm 4.84   HEMOGLOBIN                13.5 - 17.5 g/dL 14.9   HEMATOCRIT                37.0 - 53.0 % 43.4   MCV                        80 - 100 fL 90   MCH                        26.0 - 34.0 pg 30.8   MCHC                      32.0 - 36.0 g/dL 34.3   RDW                        11.5 - 15.5 % 11.9   PLATELET COUNT            140 - 440 thou/cu mm 273   MPV                        6.5 - 11.0 fL 9.6   NRBC                      % 0.0   ABS NRBC  thou /cu mm 0.0   % NEUT  % 61.0   % LYMPH  % 29.5   % MONO  % 6.5   % EOS  % 2.3   % BASO  % 0.5   % IMMATURE GRAN (METAS,MYELOS,PROS)  % 0.2   ABSOLUTE NEUTROPHILS      1.7 - 7.0 thou/cu mm 4.0   ABSOLUTE LYMPHOCYTES      0.9 - 2.9 thou/cu mm 2.0   ABSOLUTE MONOCYTES        <0.9 thou/cu mm 0.4   ABSOLUTE EOSINOPHILS      <0.5 thou/cu mm 0.2   ABSOLUTE BASOPHILS        <0.3 thou/cu mm 0.0   ABSOLUTE IMMATURE GRANULOCYTES(METAS,MYELOS,PROS)  <0.3 thou/cu mm 0.0   Resulting [facility] LABORATORY   Specimen Collected: 10/14/23  6:17 PM     EKG/ stress test - if available please see in ROS above   No results found.       No data to display                  The patient's records and results personally reviewed by this provider.     Outside records reviewed from: Care Everywhere      Assessment    Abdi Baer is a 33 year old male seen as a PAC referral for risk assessment and optimization for anesthesia.    Plan/Recommendations  Pt will  "be optimized for the proposed procedure.  See below for details on the assessment, risk, and preoperative recommendations    NEUROLOGY  - No history of TIA, CVA or seizure    - H/o TBI while in  with subsequent migraines   ~ follows in the [facility]   ~ managed with Botox Q 3 months and Aimovig  monthly   ~ reports well controlled on the above medications    -Post Op delirium risk factors:  No risk identified    ENT  - No current airway concerns.  Will need to be reassessed day of surgery.  Mallampati: Unable to assess  TM: Unable to assess    CARDIAC  - No history of CAD, Hypertension, and Afib    -  Denies cardiac symptoms.    - METS (Metabolic Equivalents)  Patient performs 4 or more METS exercise without symptoms             Total Score: 0      RCRI-Very low risk: Class 1 0.4% complication rate             Total Score: 0        PULMONARY  - MASON Low Risk             Total Score: 1    MASON: Male      - Asthma and seasonal allergies   ~ takes Montelukast and Zyrtec scheduled   ~ uses albuterol and Symbicort as needed>>has not been using as well controlled      - Tobacco History    History   Smoking Status    Never   Smokeless Tobacco    Current    Types: Chew    Last attempt to quit: 2018       GI  - GERD   ~ well controlled on PPI   ~ continue PPI DOs    PONV Medium Risk  Total Score: 2           1 AN PONV: Patient is not a current smoker    1 AN PONV: Intended Post Op Opioids        /RENAL  - Baseline Creatinine  see above     ENDOCRINE    - BMI: Estimated body mass index is 32.61 kg/m  as calculated from the following:    Height as of this encounter: 1.753 m (5' 9\").    Weight as of this encounter: 100.2 kg (220 lb 12.8 oz).  Obesity (BMI >30)    - No history of Diabetes Mellitus    HEME  VTE Low Risk 0.5%             Total Score: 2    VTE: Male      - No history of abnormal bleeding or antiplatelet use.    - Denies a h/o anemia or previous blood transfusion    ID  - Truvada prophylaxis    MSK  Patient is " NOT Frail             Total Score: 0        PSYCH  - PTSD, ADHD, depression and anxiety   ~ Follows at [facility] with psychiatry and therapist   ~ Reports doing well with therapist and medical management of Abilify, Paxil and Trintellix    - h/o Alcohol use disorder   ~ in remission X3 years    - Marijuana , edibles occasionally    Different anesthesia methods/types have been discussed with the patient, but they are aware that the final plan will be decided by the assigned anesthesia provider on the date of service.      The patient is optimized for their procedure. AVS with information on surgery time/arrival time, meds and NPO status given by nursing staff. No further diagnostic testing indicated.    Please refer to the physical examination documented by the anesthesiologist in the anesthesia record on the day of surgery.    Video-Visit Details    Type of service:  Video Visit    Provider received verbal consent for a Video Visit from the patient? Yes     Originating Location (pt. Location): Home    Distant Location (provider location):  Off-site  Mode of Communication:  Video Conference via [facility]  On the day of service:     Prep time: 12 minutes  Visit time: 11 minutes  Documentation time: 12 minutes  ------------------------------------------  Total time: 35 minutes      SCOTT Felipe CNP  Preoperative Assessment Center  [facility]  Clinic and [facility] Center  Phone: [phone]  Fax: [phone]    Physical Exam    Airway        Mallampati: III       Respiratory Devices and Support         Dental       (+) Minor Abnormalities - some fillings, tiny chips      Cardiovascular   cardiovascular exam normal          Pulmonary   pulmonary exam normal                Anesthesia Plan    ASA Status:  2    NPO Status:  NPO Appropriate    Anesthesia Type: MAC.     - Reason for MAC: straight local not clinically adequate   Induction: Intravenous.   Maintenance: TIVA.        Consents    Anesthesia  Plan(s) and associated risks, benefits, and realistic alternatives discussed. Questions answered and patient/representative(s) expressed understanding.     - Discussed: Risks, Benefits and Alternatives for BOTH SEDATION and the PROCEDURE were discussed     - Discussed with:  Patient            Postoperative Care    Pain management: IV analgesics, Oral pain medications, Multi-modal analgesia.   PONV prophylaxis: Ondansetron (or other 5HT-3), Dexamethasone or Solumedrol, Background Propofol Infusion     Comments:

## 2024-09-12 NOTE — ANESTHESIA POSTPROCEDURE EVALUATION
Patient: Abdi Baer    Procedure: Procedure(s):  EXAM UNDER ANESTHESIA, RECTUM, ANOSCOPY, HIGH RESOLUTION, WITH ANAL DYSPLASIA FULGURATION, BIOPSIES       Anesthesia Type:  MAC    Note:  Disposition: Outpatient   Postop Pain Control: Uneventful            Sign Out: Well controlled pain   PONV: No   Neuro/Psych: Uneventful            Sign Out: Acceptable/Baseline neuro status   Airway/Respiratory: Uneventful            Sign Out: Acceptable/Baseline resp. status   CV/Hemodynamics: Uneventful            Sign Out: Acceptable CV status; No obvious hypovolemia; No obvious fluid overload   Other NRE: NONE   DID A NON-ROUTINE EVENT OCCUR?            Last vitals:  Vitals Value Taken Time   /66 09/12/24 1400   Temp 36.2  C (97.2  F) 09/12/24 1400   Pulse     Resp 15 09/12/24 1400   SpO2 97 % 09/12/24 1400       Electronically Signed By: Gary Gonzalez MD  September 12, 2024  4:10 PM

## 2024-09-12 NOTE — ANESTHESIA CARE TRANSFER NOTE
Patient: Abdi Baer    Procedure: Procedure(s):  EXAM UNDER ANESTHESIA, RECTUM, ANOSCOPY, HIGH RESOLUTION, WITH ANAL DYSPLASIA FULGURATION, BIOPSIES       Diagnosis: Anal dysplasia [K62.82]  Diagnosis Additional Information: No value filed.    Anesthesia Type:   MAC     Note:    Oropharynx: oropharynx clear of all foreign objects and spontaneously breathing  Level of Consciousness: awake  Oxygen Supplementation: room air    Independent Airway: airway patency satisfactory and stable  Dentition: dentition unchanged  Vital Signs Stable: post-procedure vital signs reviewed and stable  Report to RN Given: handoff report given  Patient transferred to: Phase II    Handoff Report: Identifed the Patient, Identified the Reponsible Provider, Reviewed the pertinent medical history, Discussed the surgical course, Reviewed Intra-OP anesthesia mangement and issues during anesthesia, Set expectations for post-procedure period and Allowed opportunity for questions and acknowledgement of understanding      Vitals:  Vitals Value Taken Time   /64    Temp 97.5    Pulse 76    Resp 16    SpO2 93        Electronically Signed By: SCOTT Espinoza CRNA  September 12, 2024  1:33 PM

## 2024-09-12 NOTE — OP NOTE
"[facility] Colorectal Surgery Operative Report  September 12, 2024      PREOPERATIVE DIAGNOSIS:  1. History of high grade anal epithelial neoplasia  2. GERD  3. Asthma  4. PTSD     POSTOPERATIVE DIAGNOSIS:   1. History of high grade anal epithelial neoplasia  2. GERD  3. Asthma  4. PTSD     PROCEDURE:  1. Evaluation under anesthesia.  2. High resolution anoscopy  3. Anal biopsy  4. Fulguration of high grade anal dysplasia     ANESTHESIA: general anesthesia plus local anesthesia.     SURGEON:  Jerome Reddy MD, PhD      ASSISTANT(S): None     INDICATIONS FOR PROCEDURE  Abdi Baer is a 33 year old male who presented with history of anal epithelial neoplasia and the following history:     8/2023 - anal condyloma excised & fulgurated by Luz Forrest at 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     General risks related to anorectal surgery were reviewed with the patient. These include, but are not limited to urinary retention, abscess, infection, bleeding, chronic pain, anal stenosis, fistula, fissure, and fecal incontinence.     OPERATIVE PROCEDURE: After obtaining informed consent, the patient was brought to the operating room and placed in the lithotomy position. Appropriate preoperative mechanical deep venous thrombosis prophylaxis was administered. MAC anesthesia was gently induced. Bilateral lower extremity pneumatic compression devices were applied and all pressure points were cushioned.     After a \"time-out\" was performed, a total of 30 ml of Bupivacaine 0.25% without epinephrine was injected as a pudendal block.     Anal cytology was obtained with Dacron swab. Digital anal rectal exam was performed with no " significant findings. Dilute acetic acid soak was completed for 2 minutes. Lubricant was used to insert the anoscope. Performed high resolution microscopy using the THD video anoscope. The dentate line was viewed in its entirety. Abnormal areas noted were as follows:    Right posterior SCJ - biopsy & fulguration  Left atnerior SCJ - biopsy & fulguration  Right anterior anal verge - biopsy (suspected diffuse low grade dysplasia)    Biopsy was obtained. Fulguration was performed.     The perianal area was inspected after acetic acid soak. The findings noted were as above.     The patient tolerated the procedures well.    PLAN: Will follow up with patient with results of biopsies. If low grade dysplasia or normal, follow up in 12 months for repeat high resolution anoscopy. If high grade dysplasia 6 months.     COMPLICATIONS: none, immediately.    ESTIMATED BLOOD LOSS: 5 mL.    SPECIMEN(S): see above.    OPERATIVE FINDINGS: see above    DISPOSITION: PACU.    Jerome Reddy MD, PhD  Division of Colon and Rectal Surgery  [facility]      CC:  [facility] Surgery billing.  Camille Fuller NP.

## 2024-09-13 LAB
PATH REPORT.COMMENTS IMP SPEC: ABNORMAL
PATH REPORT.COMMENTS IMP SPEC: ABNORMAL
PATH REPORT.COMMENTS IMP SPEC: YES
PATH REPORT.FINAL DX SPEC: ABNORMAL
PATH REPORT.GROSS SPEC: ABNORMAL
PATH REPORT.MICROSCOPIC SPEC OTHER STN: ABNORMAL
PATH REPORT.RELEVANT HX SPEC: ABNORMAL

## 2024-09-16 LAB
PATH REPORT.COMMENTS IMP SPEC: NORMAL
PATH REPORT.FINAL DX SPEC: NORMAL
PATH REPORT.GROSS SPEC: NORMAL
PATH REPORT.MICROSCOPIC SPEC OTHER STN: NORMAL
PATH REPORT.RELEVANT HX SPEC: NORMAL
PHOTO IMAGE: NORMAL

## 2024-09-26 ENCOUNTER — TRANSFERRED RECORDS (OUTPATIENT)
Dept: HEALTH INFORMATION MANAGEMENT | Facility: CLINIC | Age: 34
End: 2024-09-26

## 2025-02-16 ENCOUNTER — HEALTH MAINTENANCE LETTER (OUTPATIENT)
Age: 35
End: 2025-02-16

## 2025-03-16 ENCOUNTER — HEALTH MAINTENANCE LETTER (OUTPATIENT)
Age: 35
End: 2025-03-16

## 2025-03-25 ENCOUNTER — TRANSFERRED RECORDS (OUTPATIENT)
Dept: HEALTH INFORMATION MANAGEMENT | Facility: CLINIC | Age: 35
End: 2025-03-25

## 2025-04-21 ENCOUNTER — ANESTHESIA EVENT (OUTPATIENT)
Dept: SURGERY | Facility: AMBULATORY SURGERY CENTER | Age: 35
End: 2025-04-21
Payer: COMMERCIAL

## 2025-04-21 ENCOUNTER — VIRTUAL VISIT (OUTPATIENT)
Dept: SURGERY | Facility: CLINIC | Age: 35
End: 2025-04-21
Payer: COMMERCIAL

## 2025-04-21 VITALS — BODY MASS INDEX: 27.85 KG/M2 | HEIGHT: 69 IN | WEIGHT: 188 LBS

## 2025-04-21 DIAGNOSIS — Z01.818 PRE-OP EVALUATION: Primary | ICD-10-CM

## 2025-04-21 DIAGNOSIS — K62.82 ANAL DYSPLASIA: ICD-10-CM

## 2025-04-21 PROCEDURE — 98006 SYNCH AUDIO-VIDEO EST MOD 30: CPT | Performed by: NURSE PRACTITIONER

## 2025-04-21 ASSESSMENT — LIFESTYLE VARIABLES: TOBACCO_USE: 0

## 2025-04-21 ASSESSMENT — PAIN SCALES - GENERAL: PAINLEVEL_OUTOF10: NO PAIN (0)

## 2025-04-21 ASSESSMENT — ENCOUNTER SYMPTOMS: SEIZURES: 0

## 2025-04-21 NOTE — PATIENT INSTRUCTIONS
Preparing for Your Surgery      Name:  Abdi Bear   MRN:  [id number]   :  1990   Today's Date:  2025       The [place] [employer] [address] Construction Project                                Timeline 2025 -2025    This project will affect travel to the [facility], as well as the [facility] and Surgery Center.      Please check the [employer] [address] project website for the most up to date information and give yourself additional time to reach your destination.            Arriving for surgery:  Surgery date:  2025  Arrival time:  10:30 am    Restrictions due to COVID 19:    Please maintain social distance.  Masking is optional.      parking is available for anyone with mobility limitations or disabilities. (Monday- Friday 7 am- 5 pm)    Please come to:    [facility]  [address] [place] [id number]    Please check in on the 5th floor at the Ambulatory Surgery Center.      What can I eat or drink?    -  You may eat and drink normally until 8 hours prior to arrival  time. (Until 2 am)  -  You may have clear liquids until 2 hours prior to arrival  time. (Until 8:30 am)    Examples of clear liquids:  Water  Clear broth  Juices (apple, white grape, white cranberry  and cider) without pulp  Noncarbonated, powder based beverages  (lemonade and Danish-Aid)  Sodas (Sprite, 7-Up, ginger ale and seltzer)  Coffee or tea (without milk or cream)  Gatorade      Which medicines can I take?    Hold Aspirin for 7 days before surgery.   Hold Multivitamins for 7 days before surgery.  Hold Supplements for 7 days before surgery.  Hold Ibuprofen (Advil, Motrin) for 1 day before surgery--unless otherwise directed by surgeon.  Hold Naproxen (Aleve) for 4 days before surgery.    No alcohol or cannabis products for 24 hours prior to procedure.      -  DO NOT take the following medications the day of  surgery:  Cetrizine (Zyrtec)      -  PLEASE TAKE the following medications the day of surgery:   Inhalers per your routine  Aripiprazole  Omeprazole  Oxycodone if needed  Paroxetine  Trintellix       How do I prepare myself?  - Please take 2 showers (one the night prior to surgery and one the morning of surgery) using Scrubcare or Hibiclens soap.    Use this soap only from the neck to your toes. Avoid genital area      Leave the soap on your skin for one minute--then rinse thoroughly.      You may use your own shampoo and conditioner. No other hair products.   - Please remove all jewelry and body piercings.  - No lotions, deodorants or fragrance.  - No makeup or fingernail polish.   - Bring your ID and insurance card.      -Administer 2 Fleet enemas before arrival to the Surgery Center as directed by Surgeon      -If you have a Deep Brain Stimulator, a Spinal Cord Stimulator, or any implanted Neuro Device, you must bring the remote to the Surgery Center.         ALL PATIENTS ARE REQUIRED TO HAVE A RESPONSIBLE ADULT TO DRIVE AND BE IN ATTENDANCE WITH THEM FOR 24 HOURS FOLLOWING SURGERY.     Covid testing policy as of 12/06/2022  Your surgeon will notify and schedule you for a COVID test if one is needed before surgery--please direct any questions or COVID symptoms to your surgeon      Questions or Concerns:    -For questions regarding the day of surgery, please contact the Ambulatory Surgery Center at [phone].    -If you have health changes between today and your surgery, please contact your surgeon.     - For questions after surgery, please contact your surgeon's office.

## 2025-04-21 NOTE — H&P
Pre-Operative H & P     CC:  Preoperative exam to assess for increased cardiopulmonary risk while undergoing surgery and anesthesia.    Date of Encounter: 4/21/2025  Primary Care Physician:  [facility]     Reason for visit:   Encounter Diagnoses   Name Primary?    Pre-op evaluation Yes    Anal dysplasia        HPI  Abdi JUSTIN Baer is a 34 year old male who presents for pre-operative H & P in preparation for  Procedure Information       Case: [id number] Date/Time: 05/08/25 1200    Procedure: EXAM UNDER ANESTHESIA, RECTUM, ANOSCOPY, HIGH RESOLUTION, WITH ANAL DYSPLASIA FULGURATION, BIOPSIES (Rectum)    Anesthesia type: MAC    Diagnosis: Anal dysplasia [K62.82]    Pre-op diagnosis: Anal dysplasia [K62.82]    Location: [place] [address] / [facility]    Providers: Jerome Reddy MD            Mr. Hunter Baer is a 33 yo  that has a known history of anal dysplasia and is well followed by Dr. Reddy's team in the [facility] and [facility] Clinic. The patient most recently underwent EUA of rectum anoscopy with biopsy and fulguration on 9/12/2024 with Dr. Reddy.   The patient has been scheduled for the procedure as listed above for ongoing management of anal dysplasia.         The patient presents to the PAC virtually today in preparation for the above scheduled procedure with comorbid conditions including asthma, allergic rhinitis, migraines, h/o TBI, dystonia with possible demyelinating disease, chronic low back pain, h/o polysubstance disorder, GERD, PTSD, anxiety and depression.     History is obtained from the patient and chart review    Hx of abnormal bleeding or anti-platelet use: denies      Past Medical History  Past Medical History:   Diagnosis Date    Anal dysplasia     Anxiety     Asthma     Depression     Gastroesophageal reflux disease with esophagitis     Obesity     PTSD (post-traumatic stress disorder)      Seasonal allergic rhinitis        Past Surgical History  Past Surgical History:   Procedure Laterality Date    EXAM UNDER ANESTHESIA ANUS      EXAM UNDER ANESTHESIA RECTUM, ANOSCOPY, HIGH RESOLUTION, WITH CONDYLOMA FULGURATION N/A 2/29/2024    Procedure: EXAM UNDER ANESTHESIA, RECTUM, ANOSCOPY, HIGH RESOLUTION, WITH anal dysplasia FULGURATION, BIOPSIES;  Surgeon: Jerome Reddy MD;  Location: [facility] OR    EXAM UNDER ANESTHESIA RECTUM, ANOSCOPY, HIGH RESOLUTION, WITH CONDYLOMA FULGURATION N/A 9/12/2024    Procedure: EXAM UNDER ANESTHESIA, RECTUM, ANOSCOPY, HIGH RESOLUTION, WITH ANAL DYSPLASIA FULGURATION, BIOPSIES;  Surgeon: Jerome Reddy MD;  Location: [facility] OR    RECONSTRUCTION OF NOSE         Prior to Admission Medications  Current Outpatient Medications   Medication Sig Dispense Refill    albuterol (PROAIR HFA/PROVENTIL HFA/VENTOLIN HFA) 108 (90 Base) MCG/ACT inhaler Inhale 2 puffs into the lungs every 6 hours as needed for shortness of breath, wheezing or cough      ARIPiprazole (ABILIFY) 10 MG tablet Take 10 mg by mouth every morning.      botulinum toxin type A (BOTOX) 100 units injection Inject into the muscle every 3 months.      budesonide-formoterol (SYMBICORT) 160-4.5 MCG/ACT Inhaler Inhale 2 puffs into the lungs 2 times daily      cetirizine (ZYRTEC) 10 MG tablet Take 10 mg by mouth every morning      emtricitabine-tenofovir (TRUVADA) 200-300 MG per tablet Take 1 tablet by mouth every evening      Erenumab-aooe (AIMOVIG SC) Inject subcutaneously every 30 days. Monthly      eszopiclone (LUNESTA) 1 MG tablet Take 1 mg by mouth at bedtime.      montelukast (SINGULAIR) 10 MG tablet Take 10 mg by mouth at bedtime      omeprazole (PRILOSEC OTC) 20 MG EC tablet Take 20 mg by mouth every morning.      oxyCODONE (ROXICODONE) 5 MG tablet Take 1-2 tablets (5-10 mg) by mouth every 4 hours as needed for moderate to severe pain. 10 tablet 0    PARoxetine (PAXIL) 10 MG tablet Take 15 mg by mouth  daily (with lunch)      PARoxetine (PAXIL-CR) 37.5 MG 24 hr tablet Take 1 tablet by mouth every morning      TRINTELLIX 20 MG tablet Take 1 tablet by mouth every morning         Allergies  Allergies   Allergen Reactions    Doxycycline Nausea and Vomiting    Amoxicillin Rash     Has taken cefitraxone before with out reaction. Per patient. 3/11/16   Treated with Bicillin IM with no reaction 6-2018 at [facility].       Social History  Social History     Socioeconomic History    Marital status:      Spouse name: Not on file    Number of children: Not on file    Years of education: Not on file    Highest education level: Not on file   Occupational History    Not on file   Tobacco Use    Smoking status: Never     Passive exposure: Never    Smokeless tobacco: Former     Types: Chew     Quit date: 2018   Vaping Use    Vaping status: Never Used   Substance and Sexual Activity    Alcohol use: Yes     Comment: 1-2 drink s week    Drug use: Yes     Types: Marijuana     Comment: 2-3 times a week. THC klaudia    Sexual activity: Not on file   Other Topics Concern    Not on file   Social History Narrative    Not on file     Social Drivers of Health     Financial Resource Strain: Not on file   Food Insecurity: Not on file   Transportation Needs: Not on file   Physical Activity: Not on file   Stress: Not on file   Social Connections: Not on file   Interpersonal Safety: Low Risk  (9/12/2024)    Interpersonal Safety     Do you feel physically and emotionally safe where you currently live?: Yes     Within the past 12 months, have you been hit, slapped, kicked or otherwise physically hurt by someone?: No     Within the past 12 months, have you been humiliated or emotionally abused in other ways by your partner or ex-partner?: No   Housing Stability: Not on file       Family History  Family History   Problem Relation Age of Onset    Snoring Mother     Anesthesia Reaction No family hx of     Deep Vein Thrombosis (DVT) No family hx of         Review of Systems  The complete review of systems is negative other than noted in the HPI or here.   Anesthesia Evaluation   Pt has had prior anesthetic. Type: MAC and General.    No history of anesthetic complications       ROS/MED HX  ENT/Pulmonary:     (+)           allergic rhinitis,          Intermittent, asthma  Treatment: Inhaler prn,              (-) tobacco use and MASON risk factors   Neurologic:     (+)      migraines (Well controlled with monthly injections.),                       (-) no seizures, no CVA and no TIA   Cardiovascular: Comment: Denies cardiac symptoms including chest pain, SOB, palpitations, syncope, DE LA O, orthopnea, or PND.       (+)  - -   -  - -                                 Previous cardiac testing   Echo: Date: 2022 Results:  The left ventricle is normal in size.  The left ventricular systolic function is normal.  The visually estimated ejection fraction is 60-65%.  No regional wall motion abnormalityes were noted.  The right ventricle is normal size. The right ventricular systolic function is normal.  No comparison study is available.  Stress Test:  Date: Results:    ECG Reviewed:  Date: Results:    Cath:  Date: Results:   (-) taking anticoagulants/antiplatelets and dyslipidemia   METS/Exercise Tolerance: >4 METS Comment: No dedicated exercise but stays active >> walks his dog for 20 minutes most days of the week.    Hematologic:    (-) history of blood clots, anemia and history of blood transfusion   Musculoskeletal:  - neg musculoskeletal ROS     GI/Hepatic:     (+) GERD, Asymptomatic on medication,               (-) liver disease   Renal/Genitourinary:    (-) renal disease   Endo:    (-) Type I DM, Type II DM, thyroid disease, chronic steroid usage and obesity   Psychiatric/Substance Use:     (+) psychiatric history anxiety, depression and other (comment) alcohol abuse (Sober for three years. Resumed drinking but reports under control.)  Recreational drug usage: Cannabis and  Cocaine. : Marijuana:  edibles and seltzers a few times a week.  Last cocaine in December 2024..   Infectious Disease: Comment: Truvada for prevention >>follows with [facility] ID - neg infectious disease ROS     Malignancy:  - neg malignancy ROS  (-) malignancy   Other:  - neg other ROS          Virtual visit -  No vitals were obtained    Physical Exam  Constitutional: Awake, alert, cooperative, no apparent distress, and appears stated age.  Eyes: Pupils equal  HENT: Normocephalic  Respiratory: non labored breathing   Neurologic: Awake, alert, oriented to name, place and time.   Neuropsychiatric: Calm, cooperative. Normal affect.      Prior Labs/Diagnostic Studies   All labs and imaging personally reviewed   BASIC METABOLIC PANEL  Specimen: Blood - Blood specimen (specimen)  Component  Ref Range & Units 1 yr ago Comments   SODIUM  136 - 145 mmol/L 140    POTASSIUM  3.5 - 5.1 mmol/L 4.3    CHLORIDE  98 - 107 mmol/L 104    CO2,TOTAL  22 - 29 mmol/L 27    ANION GAP  5 - 18 9    GLUCOSE  70 - 99 mg/dL 83    CALCIUM  8.6 - 10.0 mg/dL 8.9    BUN  6 - 20 mg/dL 14    CREATININE  0.70 - 1.20 mg/dL 1.05    BUN/CREAT RATIO  10 - 20 13    eGFR  >90 mL/min/1.73m2 >90 As of 03/15/2022, eGFR is calculated by the CKD-EPI creatinine equation without race adjustment.  eGFR can be influenced by muscle mass, exercise, and diet.  The reported eGFR is an estimation only and is only applicable if the renal function is stable.   Resulting Agency [facility]    Specimen Collected: 10/14/23  6:17 PM     CBC WITH AUTO DIFFERENTIAL  Specimen: Blood - Blood specimen (specimen)  Component  Ref Range & Units 1 yr ago   WHITE BLOOD COUNT          4.5 - 11.0 thou/cu mm 6.6   RED BLOOD COUNT            4.30 - 5.90 mil/cu mm 4.84   HEMOGLOBIN                13.5 - 17.5 g/dL 14.9   HEMATOCRIT                37.0 - 53.0 % 43.4   MCV                        80 - 100 fL 90   MCH                        26.0 - 34.0 pg 30.8   MCHC                       32.0 - 36.0 g/dL 34.3   RDW                        11.5 - 15.5 % 11.9   PLATELET COUNT            140 - 440 thou/cu mm 273   MPV                        6.5 - 11.0 fL 9.6   NRBC                      % 0.0   ABS NRBC  thou /cu mm 0.0   % NEUT  % 61.0   % LYMPH  % 29.5   % MONO  % 6.5   % EOS  % 2.3   % BASO  % 0.5   % IMMATURE GRAN (METAS,MYELOS,PROS)  % 0.2   ABSOLUTE NEUTROPHILS      1.7 - 7.0 thou/cu mm 4.0   ABSOLUTE LYMPHOCYTES      0.9 - 2.9 thou/cu mm 2.0   ABSOLUTE MONOCYTES        <0.9 thou/cu mm 0.4   ABSOLUTE EOSINOPHILS      <0.5 thou/cu mm 0.2   ABSOLUTE BASOPHILS        <0.3 thou/cu mm 0.0   ABSOLUTE IMMATURE GRANULOCYTES(METAS,MYELOS,PROS)  <0.3 thou/cu mm 0.0   Resulting Agency [facility]   Specimen Collected: 10/14/23  6:17 PM     EKG/ stress test - if available please see in ROS above   No results found.       No data to display                  The patient's records and results personally reviewed by this provider.     Outside records reviewed from: Care Everywhere      Assessment    Abdi Baer is a 34 year old male seen as a PAC referral for risk assessment and optimization for anesthesia.    Plan/Recommendations  Pt will be optimized for the proposed procedure.  See below for details on the assessment, risk, and preoperative recommendations    NEUROLOGY  - No history of TIA, CVA or seizure    - Migraines   Well managed with Botox and Aimvig    - dystonia with possible demyelinating disease  Follows with neurology    -Post Op delirium risk factors:  No risk identified    ENT  - No current airway concerns.  Will need to be reassessed day of surgery.  Mallampati: Unable to assess  TM: Unable to assess    CARDIAC  - No history of CAD, Hypertension, and Afib    - METS (Metabolic Equivalents)  Patient performs 4 or more METS exercise without symptoms             Total Score: 0      - RCRI-Very low risk: Class 1 0.4% complication rate             Total Score: 0     "    PULMONARY  - MASON Low Risk             Total Score: 1    MASON: Male      - Asthma  Well controlled  Continue to use inhalers as prescribed and bring with DOS    - Allergic rhinitis  Cetirizine and Montelukast    - Tobacco History    History   Smoking Status    Never   Smokeless Tobacco    Former    Types: Chew    Quit date: 2018       GI  - GERD  Controlled on medications: Proton Pump Inhibitor  Continue PPI DOS    - PONV Medium Risk  Total Score: 2           1 AN PONV: Patient is not a current smoker    1 AN PONV: Intended Post Op Opioids      COLORECTAL  - Anal dysplasia   Well followed by Dr. Reddy's team in the [facility] and [facility]al Surgery Clinic  Above procedure for ongoing evaluation and treatment      /RENAL  - Baseline Creatinine  see above     ENDOCRINE    - BMI: Estimated body mass index is 28.17 kg/m  as calculated from the following:    Height as of this encounter: 1.74 m (5' 8.5\").    Weight as of this encounter: 85.3 kg (188 lb).  Overweight (BMI 25.0-29.9)    - No history of Diabetes Mellitus    HEME  VTE Low Risk 0.5%             Total Score: 2    VTE: Male      - No history of abnormal bleeding or antiplatelet use.    - Denies a h/o anemia or previous blood transfusion    ID  - PrEP  Truvada   Follows with [facility] ID     MSK  Patient is NOT Frail             Total Score: 0        PSYCH  - PTSD, depression and anxiety  Well followed by [facility]  Stable on current medications     - Cannabis use  Edibles or seltzers couple times a week  - H/o Alcohol use disorder.  Sober for 3 years  Resumed drinking but reports controlled    - h/o Cocaine use   Last used 12/2024    Different anesthesia methods/types have been discussed with the patient, but they are aware that the final plan will be decided by the assigned anesthesia provider on the date of service.    The patient is optimized for their procedure. AVS with information on surgery time/arrival time, meds and NPO status given by nursing staff. No further " diagnostic testing indicated.    Please refer to the physical examination documented by the anesthesiologist in the anesthesia record on the day of surgery.    Video-Visit Details    Type of service:  Video Visit    Provider received verbal consent for a Video Visit from the patient? Yes     Originating Location (pt. Location): Home    Distant Location (provider location):  Off-site  Mode of Communication:  Video Conference via [employer]  On the day of service:     Prep time: 13 minutes  Visit time: 8 minutes  Documentation time: 15 minutes  ------------------------------------------  Total time: 36 minutes      SCOTT Felipe CNP  Preoperative Assessment Center  [facility]  Clinic and Surgery Center  Phone: [phone]  Fax: [phone]

## 2025-04-21 NOTE — PROGRESS NOTES
Abdi is a 34 year old who is being evaluated via a billable video visit.    How would you like to obtain your AVS? MyChart  If the video visit is dropped, the invitation should be resent by: Text to cell phone: [phone]      Subjective   Abdi is a 34 year old, presenting for the following health issues:  Pre-Op Exam    HPI          Physical Exam

## 2025-05-07 ASSESSMENT — ENCOUNTER SYMPTOMS: SEIZURES: 0

## 2025-05-07 ASSESSMENT — LIFESTYLE VARIABLES: TOBACCO_USE: 0

## 2025-05-07 NOTE — ANESTHESIA PREPROCEDURE EVALUATION
Anesthesia Pre-Procedure Evaluation    Patient: Abdi Baer   MRN: [id number] : 1990          Procedure : Procedure(s):  EXAM UNDER ANESTHESIA, RECTUM, ANOSCOPY, HIGH RESOLUTION, WITH ANAL DYSPLASIA FULGURATION, BIOPSIES         Past Medical History:   Diagnosis Date    Anal dysplasia     Anxiety     Asthma     Depression     Gastroesophageal reflux disease with esophagitis     Obesity     PTSD (post-traumatic stress disorder)     Seasonal allergic rhinitis       Past Surgical History:   Procedure Laterality Date    EXAM UNDER ANESTHESIA ANUS      EXAM UNDER ANESTHESIA RECTUM, ANOSCOPY, HIGH RESOLUTION, WITH CONDYLOMA FULGURATION N/A 2024    Procedure: EXAM UNDER ANESTHESIA, RECTUM, ANOSCOPY, HIGH RESOLUTION, WITH anal dysplasia FULGURATION, BIOPSIES;  Surgeon: Jerome Reddy MD;  Location: [facility] OR    EXAM UNDER ANESTHESIA RECTUM, ANOSCOPY, HIGH RESOLUTION, WITH CONDYLOMA FULGURATION N/A 2024    Procedure: EXAM UNDER ANESTHESIA, RECTUM, ANOSCOPY, HIGH RESOLUTION, WITH ANAL DYSPLASIA FULGURATION, BIOPSIES;  Surgeon: Jerome Reddy MD;  Location: [facility] OR    RECONSTRUCTION OF NOSE        Allergies   Allergen Reactions    Doxycycline Nausea and Vomiting    Amoxicillin Rash     Has taken cefitraxone before with out reaction. Per patient. 3/11/16   Treated with Bicillin IM with no reaction  at [facility]      Social History     Tobacco Use    Smoking status: Never     Passive exposure: Never    Smokeless tobacco: Former     Types: Chew     Quit date:    Substance Use Topics    Alcohol use: Yes     Comment: 1-2 drink s week      Wt Readings from Last 1 Encounters:   25 85.3 kg (188 lb)        Anesthesia Evaluation   Pt has had prior anesthetic. Type: MAC and General.    No history of anesthetic complications       ROS/MED HX  ENT/Pulmonary:     (+)           allergic rhinitis,          Intermittent, asthma  Treatment: Inhaler prn,              (-) tobacco use  and MASON risk factors   Neurologic:     (+)      migraines (Well controlled with monthly injections.),                       (-) no seizures, no CVA and no TIA   Cardiovascular: Comment: Denies cardiac symptoms including chest pain, SOB, palpitations, syncope, DE LA O, orthopnea, or PND.       (+)  - -   -  - -                                 Previous cardiac testing   Echo: Date: 2022 Results:  The left ventricle is normal in size.  The left ventricular systolic function is normal.  The visually estimated ejection fraction is 60-65%.  No regional wall motion abnormalityes were noted.  The right ventricle is normal size. The right ventricular systolic function is normal.  No comparison study is available.  Stress Test:  Date: Results:    ECG Reviewed:  Date: Results:    Cath:  Date: Results:   (-) taking anticoagulants/antiplatelets and dyslipidemia   METS/Exercise Tolerance: >4 METS Comment: No dedicated exercise but stays active >> walks his dog for 20 minutes most days of the week.    Hematologic:    (-) history of blood clots, anemia and history of blood transfusion   Musculoskeletal:  - neg musculoskeletal ROS     GI/Hepatic:     (+) GERD, Asymptomatic on medication,               (-) liver disease   Renal/Genitourinary:    (-) renal disease   Endo:    (-) Type I DM, Type II DM, thyroid disease, chronic steroid usage and obesity   Psychiatric/Substance Use:     (+) psychiatric history anxiety, depression and other (comment) alcohol abuse (Sober for three years. Resumed drinking but reports under control.)  Recreational drug usage: Cannabis and Cocaine. : Marijuana:  edibles and seltzers a few times a week.  Last cocaine in December 2024..   Infectious Disease: Comment: Truvada for prevention >>follows with [facility] ID - neg infectious disease ROS     Malignancy:  - neg malignancy ROS  (-) malignancy   Other:  - neg other ROS            Physical Exam  Airway  Mallampati: I  TM distance: >3 FB  Neck ROM: full  Mouth  "opening: >= 4 cm    Cardiovascular - normal exam Comments: Denies cardiac symptoms including chest pain, SOB, palpitations, syncope, DE LA O, orthopnea, or PND.     Dental   (+) Minor Abnormalities - some fillings, tiny chips      Pulmonary - normal exam      Neurological - normal exam  He appears awake, alert and oriented x3.    Other Findings       OUTSIDE LABS:  CBC: No results found for: \"WBC\", \"HGB\", \"HCT\", \"PLT\"  BMP: No results found for: \"NA\", \"POTASSIUM\", \"CHLORIDE\", \"CO2\", \"BUN\", \"CR\", \"GLC\"  COAGS: No results found for: \"PTT\", \"INR\", \"FIBR\"  POC: No results found for: \"BGM\", \"HCG\", \"HCGS\"  HEPATIC: No results found for: \"ALBUMIN\", \"PROTTOTAL\", \"ALT\", \"AST\", \"GGT\", \"ALKPHOS\", \"BILITOTAL\", \"BILIDIRECT\", \"ROHAN\"  OTHER: No results found for: \"PH\", \"LACT\", \"A1C\", \"PUNEET\", \"PHOS\", \"MAG\", \"LIPASE\", \"AMYLASE\", \"TSH\", \"T4\", \"T3\", \"CRP\", \"SED\"    Anesthesia Plan    ASA Status:  2    Anesthesia Type: MAC.   Induction: intravenous.   Techniques and Equipment:       - Monitoring Plan: standard ASA monitoring.     Consents    Anesthesia Plan(s) and associated risks, benefits, and realistic alternatives discussed. Questions answered and patient/representative(s) expressed understanding.     - Discussed:     - Discussed with:  Patient       Blood Consent:      - Discussed with: not discussed.     Postoperative Care    Pain management: multimodal analgesia.        Comments:                 Alber Camacho MD    I have reviewed the pertinent notes and labs in the chart from the past 30 days and (re)examined the patient.  Any updates or changes from those notes are reflected in this note.    Clinically Significant Risk Factors Present on Admission                             # Overweight: Estimated body mass index is 28.17 kg/m  as calculated from the following:    Height as of 4/21/25: 1.74 m (5' 8.5\").    Weight as of 4/21/25: 85.3 kg (188 lb).       # Asthma: noted on problem list              "

## 2025-05-08 ENCOUNTER — ANESTHESIA (OUTPATIENT)
Dept: SURGERY | Facility: AMBULATORY SURGERY CENTER | Age: 35
End: 2025-05-08
Payer: COMMERCIAL

## 2025-05-08 ENCOUNTER — HOSPITAL ENCOUNTER (OUTPATIENT)
Facility: AMBULATORY SURGERY CENTER | Age: 35
Discharge: HOME | End: 2025-05-08
Attending: SURGERY
Payer: COMMERCIAL

## 2025-05-08 VITALS
OXYGEN SATURATION: 98 % | RESPIRATION RATE: 18 BRPM | DIASTOLIC BLOOD PRESSURE: 56 MMHG | BODY MASS INDEX: 28.64 KG/M2 | HEART RATE: 71 BPM | WEIGHT: 189 LBS | HEIGHT: 68 IN | SYSTOLIC BLOOD PRESSURE: 101 MMHG | TEMPERATURE: 96.9 F

## 2025-05-08 DIAGNOSIS — K62.82 ANAL DYSPLASIA: Primary | ICD-10-CM

## 2025-05-08 PROCEDURE — 88112 CYTOPATH CELL ENHANCE TECH: CPT | Mod: 26 | Performed by: PATHOLOGY

## 2025-05-08 PROCEDURE — G0452 MOLECULAR PATHOLOGY INTERPR: HCPCS | Mod: 26 | Performed by: STUDENT IN AN ORGANIZED HEALTH CARE EDUCATION/TRAINING PROGRAM

## 2025-05-08 PROCEDURE — 87624 HPV HI-RISK TYP POOLED RSLT: CPT | Performed by: SURGERY

## 2025-05-08 PROCEDURE — 88112 CYTOPATH CELL ENHANCE TECH: CPT | Mod: TC | Performed by: SURGERY

## 2025-05-08 RX ORDER — ONDANSETRON 2 MG/ML
4 INJECTION INTRAMUSCULAR; INTRAVENOUS ONCE
Status: COMPLETED | OUTPATIENT
Start: 2025-05-08 | End: 2025-05-08

## 2025-05-08 RX ORDER — LIDOCAINE 40 MG/G
CREAM TOPICAL
Status: ACTIVE | OUTPATIENT
Start: 2025-05-08

## 2025-05-08 RX ORDER — HYDROMORPHONE HYDROCHLORIDE 1 MG/ML
0.2 INJECTION, SOLUTION INTRAMUSCULAR; INTRAVENOUS; SUBCUTANEOUS EVERY 5 MIN PRN
Status: ACTIVE | OUTPATIENT
Start: 2025-05-08

## 2025-05-08 RX ORDER — ACETIC ACID 5 %
LIQUID (ML) MISCELLANEOUS DAILY PRN
Status: DISCONTINUED | OUTPATIENT
Start: 2025-05-08 | End: 2025-05-08 | Stop reason: HOSPADM

## 2025-05-08 RX ORDER — LIDOCAINE HYDROCHLORIDE 20 MG/ML
INJECTION, SOLUTION INFILTRATION; PERINEURAL PRN
Status: DISCONTINUED | OUTPATIENT
Start: 2025-05-08 | End: 2025-05-08

## 2025-05-08 RX ORDER — PROPOFOL 10 MG/ML
INJECTION, EMULSION INTRAVENOUS CONTINUOUS PRN
Status: DISCONTINUED | OUTPATIENT
Start: 2025-05-08 | End: 2025-05-08

## 2025-05-08 RX ORDER — DEXAMETHASONE SODIUM PHOSPHATE 10 MG/ML
4 INJECTION, SOLUTION INTRAMUSCULAR; INTRAVENOUS
Status: ACTIVE | OUTPATIENT
Start: 2025-05-08

## 2025-05-08 RX ORDER — OXYCODONE HYDROCHLORIDE 5 MG/1
5 TABLET ORAL
Status: ACTIVE | OUTPATIENT
Start: 2025-05-08

## 2025-05-08 RX ORDER — FENTANYL CITRATE 50 UG/ML
25 INJECTION, SOLUTION INTRAMUSCULAR; INTRAVENOUS EVERY 5 MIN PRN
Status: ACTIVE | OUTPATIENT
Start: 2025-05-08

## 2025-05-08 RX ORDER — OXYCODONE HYDROCHLORIDE 5 MG/1
10 TABLET ORAL
Status: ACTIVE | OUTPATIENT
Start: 2025-05-08

## 2025-05-08 RX ORDER — NALOXONE HYDROCHLORIDE 0.4 MG/ML
0.1 INJECTION, SOLUTION INTRAMUSCULAR; INTRAVENOUS; SUBCUTANEOUS
Status: ACTIVE | OUTPATIENT
Start: 2025-05-08

## 2025-05-08 RX ORDER — ONDANSETRON 2 MG/ML
4 INJECTION INTRAMUSCULAR; INTRAVENOUS EVERY 30 MIN PRN
Status: ACTIVE | OUTPATIENT
Start: 2025-05-08

## 2025-05-08 RX ORDER — ACETAMINOPHEN 325 MG/1
975 TABLET ORAL ONCE
Status: COMPLETED | OUTPATIENT
Start: 2025-05-08 | End: 2025-05-08

## 2025-05-08 RX ORDER — DEXAMETHASONE SODIUM PHOSPHATE 4 MG/ML
INJECTION, SOLUTION INTRA-ARTICULAR; INTRALESIONAL; INTRAMUSCULAR; INTRAVENOUS; SOFT TISSUE PRN
Status: DISCONTINUED | OUTPATIENT
Start: 2025-05-08 | End: 2025-05-08

## 2025-05-08 RX ORDER — SODIUM CHLORIDE, SODIUM LACTATE, POTASSIUM CHLORIDE, CALCIUM CHLORIDE 600; 310; 30; 20 MG/100ML; MG/100ML; MG/100ML; MG/100ML
INJECTION, SOLUTION INTRAVENOUS CONTINUOUS
Status: ACTIVE | OUTPATIENT
Start: 2025-05-08

## 2025-05-08 RX ORDER — FERRIC SUBSULFATE 0.21 G/G
LIQUID TOPICAL DAILY PRN
Status: DISCONTINUED | OUTPATIENT
Start: 2025-05-08 | End: 2025-05-08 | Stop reason: HOSPADM

## 2025-05-08 RX ORDER — IODINE AND POTASSIUM IODIDE 50; 100 MG/ML; MG/ML
LIQUID ORAL DAILY PRN
Status: DISCONTINUED | OUTPATIENT
Start: 2025-05-08 | End: 2025-05-08 | Stop reason: HOSPADM

## 2025-05-08 RX ORDER — FENTANYL CITRATE 50 UG/ML
50 INJECTION, SOLUTION INTRAMUSCULAR; INTRAVENOUS EVERY 5 MIN PRN
Status: ACTIVE | OUTPATIENT
Start: 2025-05-08

## 2025-05-08 RX ORDER — ONDANSETRON 4 MG/1
4 TABLET, ORALLY DISINTEGRATING ORAL EVERY 30 MIN PRN
Status: ACTIVE | OUTPATIENT
Start: 2025-05-08

## 2025-05-08 RX ORDER — GINSENG 100 MG
CAPSULE ORAL DAILY PRN
Status: DISCONTINUED | OUTPATIENT
Start: 2025-05-08 | End: 2025-05-08 | Stop reason: HOSPADM

## 2025-05-08 RX ORDER — KETAMINE HYDROCHLORIDE 10 MG/ML
INJECTION INTRAMUSCULAR; INTRAVENOUS PRN
Status: DISCONTINUED | OUTPATIENT
Start: 2025-05-08 | End: 2025-05-08

## 2025-05-08 RX ORDER — LABETALOL HYDROCHLORIDE 5 MG/ML
10 INJECTION, SOLUTION INTRAVENOUS
Status: ACTIVE | OUTPATIENT
Start: 2025-05-08

## 2025-05-08 RX ORDER — BUPIVACAINE HYDROCHLORIDE AND EPINEPHRINE 2.5; 5 MG/ML; UG/ML
INJECTION, SOLUTION EPIDURAL; INFILTRATION; INTRACAUDAL; PERINEURAL DAILY PRN
Status: DISCONTINUED | OUTPATIENT
Start: 2025-05-08 | End: 2025-05-08 | Stop reason: HOSPADM

## 2025-05-08 RX ORDER — HYDROMORPHONE HYDROCHLORIDE 1 MG/ML
0.4 INJECTION, SOLUTION INTRAMUSCULAR; INTRAVENOUS; SUBCUTANEOUS EVERY 5 MIN PRN
Status: ACTIVE | OUTPATIENT
Start: 2025-05-08

## 2025-05-08 RX ORDER — FENTANYL CITRATE 50 UG/ML
INJECTION, SOLUTION INTRAMUSCULAR; INTRAVENOUS PRN
Status: DISCONTINUED | OUTPATIENT
Start: 2025-05-08 | End: 2025-05-08

## 2025-05-08 RX ORDER — PROPOFOL 10 MG/ML
INJECTION, EMULSION INTRAVENOUS PRN
Status: DISCONTINUED | OUTPATIENT
Start: 2025-05-08 | End: 2025-05-08

## 2025-05-08 RX ORDER — ACETAMINOPHEN 325 MG/1
975 TABLET ORAL ONCE
Status: ACTIVE | OUTPATIENT
Start: 2025-05-08

## 2025-05-08 RX ADMIN — ACETAMINOPHEN 975 MG: 325 TABLET ORAL at 11:24

## 2025-05-08 RX ADMIN — FENTANYL CITRATE 25 MCG: 50 INJECTION, SOLUTION INTRAMUSCULAR; INTRAVENOUS at 11:45

## 2025-05-08 RX ADMIN — SODIUM CHLORIDE, SODIUM LACTATE, POTASSIUM CHLORIDE, CALCIUM CHLORIDE: 600; 310; 30; 20 INJECTION, SOLUTION INTRAVENOUS at 11:31

## 2025-05-08 RX ADMIN — PROPOFOL 150 MCG/KG/MIN: 10 INJECTION, EMULSION INTRAVENOUS at 11:38

## 2025-05-08 RX ADMIN — LIDOCAINE HYDROCHLORIDE 100 MG: 20 INJECTION, SOLUTION INFILTRATION; PERINEURAL at 11:38

## 2025-05-08 RX ADMIN — PROPOFOL 30 MG: 10 INJECTION, EMULSION INTRAVENOUS at 11:53

## 2025-05-08 RX ADMIN — KETAMINE HYDROCHLORIDE 20 MG: 10 INJECTION INTRAMUSCULAR; INTRAVENOUS at 11:38

## 2025-05-08 RX ADMIN — ONDANSETRON 4 MG: 2 INJECTION INTRAMUSCULAR; INTRAVENOUS at 11:21

## 2025-05-08 RX ADMIN — SODIUM CHLORIDE, SODIUM LACTATE, POTASSIUM CHLORIDE, CALCIUM CHLORIDE: 600; 310; 30; 20 INJECTION, SOLUTION INTRAVENOUS at 11:27

## 2025-05-08 RX ADMIN — PROPOFOL 30 MG: 10 INJECTION, EMULSION INTRAVENOUS at 11:38

## 2025-05-08 RX ADMIN — DEXAMETHASONE SODIUM PHOSPHATE 4 MG: 4 INJECTION, SOLUTION INTRA-ARTICULAR; INTRALESIONAL; INTRAMUSCULAR; INTRAVENOUS; SOFT TISSUE at 11:50

## 2025-05-08 NOTE — OP NOTE
"[facility] Colorectal Surgery Operative Report  May 8, 2025      PREOPERATIVE DIAGNOSIS:  1. History of high grade anal epithelial neoplasia  2. GERD  3. Asthma  4. PTSD     POSTOPERATIVE DIAGNOSIS:   1. History of high grade anal epithelial neoplasia  2. GERD  3. Asthma  4. PTSD     PROCEDURE:  1. Evaluation under anesthesia.  2. High resolution anoscopy  3. Anal biopsy  4. Fulguration of high grade anal dysplasia     ANESTHESIA: general anesthesia plus local anesthesia.     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     General risks related to anorectal surgery were reviewed with the patient. These include, but are not limited to urinary retention, abscess, infection, bleeding, chronic pain, anal stenosis, fistula, fissure, and fecal incontinence.   OPERATIVE PROCEDURE: After obtaining informed consent, the patient was brought to the operating room and placed in the lithotomy position. Appropriate preoperative mechanical deep venous thrombosis prophylaxis was administered. MAC anesthesia was gently induced. Bilateral lower extremity pneumatic compression devices were applied and all pressure points were cushioned.     After a \"time-out\" was performed, a total of 30 ml of Bupivacaine 0.25% without epinephrine was injected as a pudendal block.     Anal cytology " was obtained with Dacron swab. Digital anal rectal exam was performed with no major findings. Dilute acetic acid soak was completed for 2 minutes. Lubricant was used to insert the anoscope. Performed high resolution microscopy using the THD video anoscope. The dentate line was viewed in its entirety. Abnormal areas noted were nowhere.    Negative HRA     Biopsy was not obtained. Fulguration was not performed.     The perianal area was inspected after acetic acid soak. The findings noted were aas above.     The patient tolerated the procedures well.    PLAN: Will follow up with patient with results of HPV testing. If high-risk HPV testing present, HRA in a year.  If no high-risk HPV, follow up in one year with anal pap.    COMPLICATIONS: none, immediately.    ESTIMATED BLOOD LOSS: 1 mL.    SPECIMEN(S): None.    OPERATIVE FINDINGS: see above    DISPOSITION: PACU.    Jerome Reddy MD, PhD  Division of Colon and Rectal Surgery  [facility]      CC:  [facility] Surgery billing.  Camille Fuller NP.

## 2025-05-08 NOTE — ANESTHESIA CARE TRANSFER NOTE
Patient: Abdi Baer    Procedure: Procedure(s):  EXAM UNDER ANESTHESIA, RECTUM, ANOSCOPY, HIGH RESOLUTION, WITH ANAL DYSPLASIA FULGURATION, BIOPSIES       Diagnosis: Anal dysplasia [K62.82]  Diagnosis Additional Information: No value filed.    Anesthesia Type:   MAC     Note:    Oropharynx: oropharynx clear of all foreign objects and spontaneously breathing  Level of Consciousness: awake and drowsy  Oxygen Supplementation: room air    Independent Airway: airway patency satisfactory and stable  Dentition: dentition unchanged  Vital Signs Stable: post-procedure vital signs reviewed and stable  Report to RN Given: handoff report given  Patient transferred to: Phase II    Handoff Report: Identifed the Patient, Identified the Reponsible Provider, Reviewed the pertinent medical history, Discussed the surgical course, Reviewed Intra-OP anesthesia mangement and issues during anesthesia, Set expectations for post-procedure period and Allowed opportunity for questions and acknowledgement of understanding    Vitals:  Vitals Value Taken Time   BP 97/62 05/08/25 12:18   Temp 25.7  C (78.26  F) 05/08/25 12:21   Pulse 71 05/08/25 12:18   Resp     SpO2 94 % 05/08/25 12:21   Vitals shown include unfiled device data.    Electronically Signed By: SCOTT Lamb CRNA  May 8, 2025  12:22 PM

## 2025-05-08 NOTE — DISCHARGE INSTRUCTIONS
[facility]  Home Care Following Anesthesia  For 24 hours after surgery:  Get plenty of rest.  A responsible adult must stay with you for at least 24 hours after you leave the surgery center.  Do not drive or use heavy equipment.  If you have weakness or tingling, don't drive or use heavy equipment until this feeling goes away.   Do not drink alcohol.   Avoid strenuous or risky activities.  Ask for help when climbing stairs.  You may feel lightheaded.  IF so, sit for a few minutes before standing.  Have someone help you get up.   If you have nausea (feel sick to your stomach): Drink only clear liquids such as apple juice, ginger ale, broth or 7-Up.  Rest may also help.  Be sure to drink enough fluids.  Move to a regular diet as you feel able.   You may have a slight fever.  Call the doctor if your fever is over 100 F (37.7 C) (taken under the tongue) or lasts longer than 24 hours.  You may have a dry mouth, a sore throat, muscle aches or trouble sleeping. These should go away after 24 hours.  Do not make important or legal decisions.   It is recommended to avoid smoking.               Tips for taking pain medications  To get the best pain relief possible, remember these points:  Take pain medications as directed, before pain becomes severe.  Pain medication can upset your stomach: taking it with food may help.  Constipation is a common side effect of pain medication. Drink plenty of  fluids.  Eat foods high in fiber. Take a stool softener if recommended by your doctor or pharmacist.  Do not drink alcohol, drive or operate machinery while taking pain medications.  Ask about other ways to control pain, such as with heat, ice or relaxation.    Tylenol/Acetaminophen Consumption    If you feel your pain relief is insufficient, you may take Tylenol/Acetaminophen in addition to your narcotic pain medication.   Be careful not to exceed 4,000 mg of Tylenol/Acetaminophen in a 24 hour  period from all sources.  If you are taking extra strength Tylenol/acetaminophen (500 mg), the maximum dose is 8 tablets in 24 hours.  If you are taking regular strength acetaminophen (325 mg), the maximum dose is 12 tablets in 24 hours.  Last dose of Tylenol received at 1130am, 975mg. May have next dose after 530pm.     Call a doctor for any of the following:  Signs of infection (fever, growing tenderness at the surgery site, a large amount of drainage or bleeding, severe pain, foul-smelling drainage, redness, swelling).  It has been over 8 to 10 hours since surgery and you are still not able to urinate (pass water).  Headache for over 24 hours.  Numbness, tingling or weakness the day after surgery (if you had spinal anesthesia).  Signs of Covid-19 infection (temperature over 100 degrees, shortness of breath, cough, loss of taste/smell, generalized body aches, persistent headache, chills, sore throat, nausea/vomiting/diarrhea)    Your doctor is:  Dr. Jerome Reddy, [facility]: [phone]                    After hours and weekends call the hospital @ [phone] and ask for the resident on call for:  [facility]  For emergency care, call the:  [facility]:  [phone] (TTY for hearing impaired: [phone])

## 2025-05-08 NOTE — ANESTHESIA POSTPROCEDURE EVALUATION
Patient: Abdi Baer    Procedure: Procedure(s):  EXAM UNDER ANESTHESIA, RECTUM, ANOSCOPY, HIGH RESOLUTION, WITH ANAL DYSPLASIA FULGURATION, BIOPSIES       Anesthesia Type:  MAC    Note:  Disposition: Outpatient   Postop Pain Control: Uneventful            Sign Out: Well controlled pain   PONV: No   Neuro/Psych: Uneventful            Sign Out: Acceptable/Baseline neuro status   Airway/Respiratory: Uneventful            Sign Out: Acceptable/Baseline resp. status   CV/Hemodynamics: Uneventful            Sign Out: Acceptable CV status; No obvious hypovolemia; No obvious fluid overload   Other NRE:    DID A NON-ROUTINE EVENT OCCUR?            Last vitals:  Vitals Value Taken Time   /56 05/08/25 12:45   Temp 26  C (78.8  F) 05/08/25 12:45   Pulse 71 05/08/25 12:45   Resp 18 05/08/25 12:45   SpO2 98 % 05/08/25 12:46   Vitals shown include unfiled device data.    Electronically Signed By: Alber Camacho MD  May 8, 2025  12:47 PM

## 2025-05-12 LAB
PATH REPORT.COMMENTS IMP SPEC: ABNORMAL
PATH REPORT.COMMENTS IMP SPEC: YES
PATH REPORT.FINAL DX SPEC: ABNORMAL
PATH REPORT.GROSS SPEC: ABNORMAL
PATH REPORT.MICROSCOPIC SPEC OTHER STN: ABNORMAL
PATH REPORT.RELEVANT HX SPEC: ABNORMAL

## 2025-05-27 ENCOUNTER — RESULTS FOLLOW-UP (OUTPATIENT)
Dept: SURGERY | Facility: CLINIC | Age: 35
End: 2025-05-27

## (undated) DEVICE — GLOVE BIOGEL PI MICRO INDICATOR UNDERGLOVE SZ 7.5 48975

## (undated) DEVICE — APPLICATORS COTTON TIP 6"X2 STERILE LF C15053-006

## (undated) DEVICE — CUP AND LID 2PK 2OZ STERILE  SSK9006A

## (undated) DEVICE — PANTIES MESH LG/XLG 2PK 706M2

## (undated) DEVICE — SWAB PROCTO 16" 2/PK 32-046

## (undated) DEVICE — ANOSCOPE EXAMINATION L 112MMÂ 800191

## (undated) DEVICE — APPLICATOR FIBER TIP 6" PLASTIC 258061PDAL A5005-1

## (undated) DEVICE — TUBING SUCTION 10'X3/16" N510

## (undated) DEVICE — STRAP KNEE/BODY 31143004

## (undated) DEVICE — LINEN TOWEL PACK X5 5464

## (undated) DEVICE — PAD CHUX UNDERPAD 30X30"

## (undated) DEVICE — GLOVE BIOGEL PI MICRO SZ 7.0 48570

## (undated) DEVICE — HANDPIECE SMOKE EVAC FUSION THDÂ Â 14-2001

## (undated) DEVICE — ESU HOLSTER PLASTIC DISP E2400

## (undated) DEVICE — MINOR SINGLE BASIN KIT CSR WRAPX2 7QT SSK3002

## (undated) DEVICE — KIT SUREPATH 10ML  COLLECTION VIAL 1305190

## (undated) DEVICE — ESU GROUND PAD ADULT W/CORD E7507

## (undated) DEVICE — SPECIMEN CONTAINER 5OZ STERILE 2600SA

## (undated) DEVICE — SUCTION MANIFOLD NEPTUNE 2 SYS 1 PORT 702-025-000

## (undated) DEVICE — Device

## (undated) DEVICE — DRSG TELFA 3X8" 1238

## (undated) DEVICE — OINTMENT ANTIBIOTIC BACITRACIN ZINC .9 G 1171

## (undated) DEVICE — DRSG TEGADERM 4X4 3/4" 1626W

## (undated) DEVICE — DRAPE LEGGINGS CLEAR 8430

## (undated) DEVICE — DRSG GAUZE 4X4" TRAY 6939

## (undated) DEVICE — ELECTRODE SURGICAL BLADE 6"Â 14-1066P

## (undated) DEVICE — PACK RECTAL KIT CUSTOM ASC

## (undated) DEVICE — SPECIMEN CONTAINER W/10% BUFFERED FORMALIN 120ML 591201

## (undated) DEVICE — TAPE MEDIPORE 4"X2YD 2864

## (undated) DEVICE — SOL WATER IRRIG 500ML BOTTLE 2F7113

## (undated) DEVICE — DRSG TEGADERM 2 3/8X2 3/4" 1624W

## (undated) DEVICE — ESU HOLDER LAP INST DISP PURPLE LONG 330MM H-PRO-330

## (undated) DEVICE — PACK RECTAL UMMC

## (undated) DEVICE — PAD CHUX UNDERPAD 30X36" P3036C

## (undated) DEVICE — GLOVE BIOGEL PI MICRO INDICATOR UNDERGLOVE SZ 7.0 48970

## (undated) DEVICE — DECANTER VIAL 2006S

## (undated) DEVICE — SUCTION MANIFOLD NEPTUNE 2 SYS 4 PORT 0702-020-000

## (undated) DEVICE — ANOSCOPE PLASTIC CLEAR UNSTERILE 82420

## (undated) DEVICE — GLOVE PROTEXIS MICRO 7.0  2D73PM70

## (undated) DEVICE — SPONGE RAY-TEC 4X8" 7318

## (undated) RX ORDER — ACETAMINOPHEN 325 MG/1
TABLET ORAL
Status: DISPENSED
Start: 2025-05-08

## (undated) RX ORDER — ACETIC ACID 5 %
LIQUID (ML) MISCELLANEOUS
Status: DISPENSED
Start: 2024-02-29

## (undated) RX ORDER — FENTANYL CITRATE 50 UG/ML
INJECTION, SOLUTION INTRAMUSCULAR; INTRAVENOUS
Status: DISPENSED
Start: 2024-02-29

## (undated) RX ORDER — FENTANYL CITRATE 50 UG/ML
INJECTION, SOLUTION INTRAMUSCULAR; INTRAVENOUS
Status: DISPENSED
Start: 2024-09-12

## (undated) RX ORDER — ACETIC ACID 5 %
LIQUID (ML) MISCELLANEOUS
Status: DISPENSED
Start: 2024-09-12

## (undated) RX ORDER — ONDANSETRON 2 MG/ML
INJECTION INTRAMUSCULAR; INTRAVENOUS
Status: DISPENSED
Start: 2025-05-08

## (undated) RX ORDER — PROPOFOL 10 MG/ML
INJECTION, EMULSION INTRAVENOUS
Status: DISPENSED
Start: 2025-05-08

## (undated) RX ORDER — HYDROMORPHONE HCL IN WATER/PF 6 MG/30 ML
PATIENT CONTROLLED ANALGESIA SYRINGE INTRAVENOUS
Status: DISPENSED
Start: 2024-02-29

## (undated) RX ORDER — BUPIVACAINE HYDROCHLORIDE AND EPINEPHRINE 2.5; 5 MG/ML; UG/ML
INJECTION, SOLUTION EPIDURAL; INFILTRATION; INTRACAUDAL; PERINEURAL
Status: DISPENSED
Start: 2024-02-29

## (undated) RX ORDER — ACETAMINOPHEN 325 MG/1
TABLET ORAL
Status: DISPENSED
Start: 2024-09-12

## (undated) RX ORDER — ACETIC ACID 5 %
LIQUID (ML) MISCELLANEOUS
Status: DISPENSED
Start: 2024-01-05

## (undated) RX ORDER — LIDOCAINE HYDROCHLORIDE 20 MG/ML
JELLY TOPICAL
Status: DISPENSED
Start: 2024-01-05

## (undated) RX ORDER — IODINE AND POTASSIUM IODIDE 50; 100 MG/ML; MG/ML
LIQUID ORAL
Status: DISPENSED
Start: 2024-02-29

## (undated) RX ORDER — KETOROLAC TROMETHAMINE 30 MG/ML
INJECTION, SOLUTION INTRAMUSCULAR; INTRAVENOUS
Status: DISPENSED
Start: 2024-09-12

## (undated) RX ORDER — PROPOFOL 10 MG/ML
INJECTION, EMULSION INTRAVENOUS
Status: DISPENSED
Start: 2024-02-29

## (undated) RX ORDER — FERRIC SUBSULFATE 0.21 G/G
LIQUID TOPICAL
Status: DISPENSED
Start: 2024-01-05

## (undated) RX ORDER — ONDANSETRON 2 MG/ML
INJECTION INTRAMUSCULAR; INTRAVENOUS
Status: DISPENSED
Start: 2024-02-29

## (undated) RX ORDER — ONDANSETRON 2 MG/ML
INJECTION INTRAMUSCULAR; INTRAVENOUS
Status: DISPENSED
Start: 2024-09-12

## (undated) RX ORDER — PROPOFOL 10 MG/ML
INJECTION, EMULSION INTRAVENOUS
Status: DISPENSED
Start: 2024-09-12

## (undated) RX ORDER — FENTANYL CITRATE 50 UG/ML
INJECTION, SOLUTION INTRAMUSCULAR; INTRAVENOUS
Status: DISPENSED
Start: 2025-05-08